# Patient Record
Sex: MALE | Race: WHITE | NOT HISPANIC OR LATINO | Employment: FULL TIME | URBAN - NONMETROPOLITAN AREA
[De-identification: names, ages, dates, MRNs, and addresses within clinical notes are randomized per-mention and may not be internally consistent; named-entity substitution may affect disease eponyms.]

---

## 2017-09-05 ENCOUNTER — OFFICE VISIT (OUTPATIENT)
Dept: PODIATRY | Facility: CLINIC | Age: 47
End: 2017-09-05

## 2017-09-05 VITALS
DIASTOLIC BLOOD PRESSURE: 90 MMHG | OXYGEN SATURATION: 96 % | BODY MASS INDEX: 39.36 KG/M2 | SYSTOLIC BLOOD PRESSURE: 164 MMHG | HEIGHT: 73 IN | HEART RATE: 93 BPM | WEIGHT: 297 LBS

## 2017-09-05 DIAGNOSIS — M72.2 PLANTAR FASCIITIS: Primary | ICD-10-CM

## 2017-09-05 PROCEDURE — 99202 OFFICE O/P NEW SF 15 MIN: CPT | Performed by: PODIATRIST

## 2017-09-05 NOTE — PROGRESS NOTES
"Kemal Dunbar  1970  46 y.o. male     Patient presents today with left foot pain. The patient had x-rays done on 08/30/2017.    9/5/2017  Chief Complaint   Patient presents with   • Left Foot - Pain           History of Present Illness    46-year-old male presents to clinic today with chief complaint of left foot pain.  Pain is located to the bottom of his heels.  He has been present for several weeks.  He states the pain is getting much better.  He was recently prescribed diclofenac which is helping tremendously.  Currently rates pain as a 3 out of 10.  He denies any injuries to his foot.  He has no other pedal complaints.         No past medical history on file.      No past surgical history on file.      No family history on file.      Social History     Social History   • Marital status:      Spouse name: N/A   • Number of children: N/A   • Years of education: N/A     Occupational History   • Not on file.     Social History Main Topics   • Smoking status: Not on file   • Smokeless tobacco: Not on file   • Alcohol use Not on file   • Drug use: Not on file   • Sexual activity: Not on file     Other Topics Concern   • Not on file     Social History Narrative         Current Outpatient Prescriptions   Medication Sig Dispense Refill   • AMLODIPINE-VALSARTAN-HCTZ PO Take  by mouth.     • diclofenac (VOLTAREN) 50 MG EC tablet Take 1 tablet by mouth 3 (Three) Times a Day. 30 tablet 0   • Buchu-Cornsilk-Ch Grass-Hydran (HYDRO-TABS PO) Take  by mouth.       No current facility-administered medications for this visit.          OBJECTIVE    /90  Pulse 93  Ht 73\" (185.4 cm)  Wt 297 lb (135 kg)  SpO2 96%  BMI 39.18 kg/m2      Review of Systems   Constitutional: Negative for chills and fever.   Cardiovascular: Negative for chest pain.   Gastrointestinal: Negative for constipation, diarrhea, nausea and vomiting.   Skin: Negative for wound.   Musculoskeletal:Left foot pain      Constitutional: well " developed, well nourished    HEENT: Normocephalic and atraumatic, normal hearing    Respiratory: Non labored respirations noted    Left lower extremity exam    Cardiovascular:    DP/PT pulses palpable    CFT brisk  to all digits  No erythema or edema noted     Musculoskeletal:  Muscle strength is 5/5 for all muscle groups tested   ROM of the 1st MTP is full without pain or crepitus  ROM of the MTJ is full without pain or crepitus    ROM of the STJ is full without pain or crepitus    ROM of the ankle joint is full without pain or crepitus    Mild tenderness to palpation to the medial tubercle of the left calcaneus.  Negative lateral squeeze test.  Rectus foot type     Dermatological:   Skin is warm, dry and intact    No subcutaneous nodules or masses noted    No open wounds noted     Neurological:   Sensation intact to light touch    DTR intact    Psychiatric: A&O x 3 with normal mood and affect. NAD.         Procedures        ASSESSMENT AND PLAN    Kemal was seen today for pain.    Diagnoses and all orders for this visit:    Plantar fasciitis    - Comprehensive foot and ankle exam performed  - X-rays  reviewed  - Patient advised to perform stretching exercises, icing and to make appropriate shoe gear changes to include wearing athletic type shoes with supportive insoles.  No bare foot walking.  Patient also given written instructions on how to correctly perform the stretching of the Achilles tendon/calf stretches, and the heel spur/plantar fasciitis regimen.  - Recommended over-the-counter insole such as power steps to properly support the arch in order to alleviate the tension in stress on the plantar fascia associated with normal daily walking. Patient was educated on the break-in period for new orthotics.  - Continue diclofenac  - Patient is in agreement with the current treatment plan.  All  questions were answered to their satisfaction.  - RTC  if symptoms worsen or fail to improve           This document has  been electronically signed by Fabian Ramirez DPM on September 5, 2017 5:53 PM     9/5/2017  5:53 PM    EMR Dragon/Transcription disclaimer:   Much of this encounter note is an electronic transcription/translation of spoken language to printed text. The electronic translation of spoken language may permit erroneous, or at times, nonsensical words or phrases to be inadvertently transcribed; Although I have reviewed the note for such errors, some may still exist.

## 2018-01-26 ENCOUNTER — HOSPITAL ENCOUNTER (EMERGENCY)
Facility: HOSPITAL | Age: 48
Discharge: HOME | End: 2018-01-26
Attending: EMERGENCY MEDICINE | Admitting: EMERGENCY MEDICINE

## 2018-01-26 VITALS
DIASTOLIC BLOOD PRESSURE: 90 MMHG | SYSTOLIC BLOOD PRESSURE: 158 MMHG | HEIGHT: 73 IN | WEIGHT: 300 LBS | BODY MASS INDEX: 39.76 KG/M2 | OXYGEN SATURATION: 96 % | TEMPERATURE: 98 F | RESPIRATION RATE: 20 BRPM | HEART RATE: 96 BPM
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
## 2018-01-26 PROCEDURE — 25010000002 TDAP 5-2.5-18.5 LF-MCG/0.5 SUSPENSION: Performed by: EMERGENCY MEDICINE

## 2018-01-26 PROCEDURE — 90715 TDAP VACCINE 7 YRS/> IM: CPT | Performed by: EMERGENCY MEDICINE

## 2018-01-26 PROCEDURE — 90471 IMMUNIZATION ADMIN: CPT | Performed by: EMERGENCY MEDICINE

## 2018-01-26 PROCEDURE — 99283 EMERGENCY DEPT VISIT LOW MDM: CPT

## 2018-01-26 RX ORDER — AMOXICILLIN AND CLAVULANATE POTASSIUM 875; 125 MG/1; MG/1
1 TABLET, FILM COATED ORAL EVERY 12 HOURS
Qty: 14 TABLET | Refills: 0 | Status: SHIPPED | OUTPATIENT
Start: 2018-01-26 | End: 2018-02-02

## 2018-01-26 RX ORDER — IBUPROFEN 200 MG
1 TABLET ORAL ONCE
Status: COMPLETED | OUTPATIENT
Start: 2018-01-26 | End: 2018-01-26

## 2018-01-26 RX ORDER — HYDROCHLOROTHIAZIDE 25 MG/1
25 TABLET ORAL
COMMUNITY
Start: 2017-09-27 | End: 2018-03-27

## 2018-01-26 RX ADMIN — BACITRACIN ZINC, NEOMYCIN, POLYMYXIN B 1 APPLICATION: 400; 3.5; 5 OINTMENT TOPICAL at 07:52

## 2018-01-26 RX ADMIN — TETANUS TOXOID, REDUCED DIPHTHERIA TOXOID AND ACELLULAR PERTUSSIS VACCINE, ADSORBED 0.5 ML: 5; 2.5; 8; 8; 2.5 SUSPENSION INTRAMUSCULAR at 07:51

## 2018-01-26 NOTE — ED PROVIDER NOTES
Subjective   Patient is a 47 y.o. male presenting with animal bite.   History provided by:  Patient  Animal Bite   Contact animal:  Dog  Location:  Hand  Hand injury location:  R hand  Time since incident:  1 hour  Pain details:     Quality:  Sharp    Severity:  Mild    Timing:  Constant    Progression:  Unchanged  Notifications:  None  Animal's rabies vaccination status:  Up to date  Animal in possession: yes    Tetanus status:  Out of date  Relieved by:  Nothing  Worsened by:  Nothing      Review of Systems   Constitutional: Negative.    HENT: Negative.    Eyes: Negative.    Respiratory: Negative.    Endocrine: Negative.    Genitourinary: Negative.    Skin: Positive for wound.   Allergic/Immunologic: Negative.        Past Medical History:   Diagnosis Date   • Hypertension        No Known Allergies    Past Surgical History:   Procedure Laterality Date   • APPENDECTOMY         History reviewed. No pertinent family history.    Social History     Social History   • Marital status:      Spouse name: N/A   • Number of children: N/A   • Years of education: N/A     Social History Main Topics   • Smoking status: Never Smoker   • Smokeless tobacco: None   • Alcohol use Yes      Comment: occasionally   • Drug use: No   • Sexual activity: Not Asked     Other Topics Concern   • None     Social History Narrative   • None           Objective   Physical Exam   Constitutional: He is oriented to person, place, and time. He appears well-developed and well-nourished.   HENT:   Head: Normocephalic.   Cardiovascular: Normal rate and regular rhythm.    Musculoskeletal: Normal range of motion.   Neurological: He is alert and oriented to person, place, and time.   Skin:   Several small abrasions and laceration to the right middle finger.  There is no suturable wound.   Vitals reviewed.      Procedures         ED Course  ED Course                  MDM      Final diagnoses:   Dog bite, initial encounter            Jagdish Pedroza,  MD  01/26/18 0800       Jagdish Pedroza MD  01/26/18 0800

## 2018-01-26 NOTE — ED NOTES
Patient presented to ED after dog bite from patients dog. Right middle finger with puncture wounds and swelling.     Sary Juarez LPN  01/26/18 0736

## 2018-03-06 ENCOUNTER — CONSULT (OUTPATIENT)
Dept: SLEEP MEDICINE | Facility: HOSPITAL | Age: 48
End: 2018-03-06

## 2018-03-06 VITALS
WEIGHT: 315 LBS | HEART RATE: 90 BPM | HEIGHT: 73 IN | SYSTOLIC BLOOD PRESSURE: 150 MMHG | DIASTOLIC BLOOD PRESSURE: 90 MMHG | OXYGEN SATURATION: 98 % | BODY MASS INDEX: 41.75 KG/M2

## 2018-03-06 DIAGNOSIS — G47.33 OBSTRUCTIVE SLEEP APNEA, ADULT: Primary | ICD-10-CM

## 2018-03-06 DIAGNOSIS — F98.8 ATTENTION DEFICIT DISORDER, UNSPECIFIED HYPERACTIVITY PRESENCE: ICD-10-CM

## 2018-03-06 PROCEDURE — 99203 OFFICE O/P NEW LOW 30 MIN: CPT | Performed by: INTERNAL MEDICINE
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
## 2018-03-30 ENCOUNTER — APPOINTMENT (OUTPATIENT)
Dept: SLEEP MEDICINE | Facility: HOSPITAL | Age: 48
End: 2018-03-30
Attending: INTERNAL MEDICINE

## 2018-04-04 ENCOUNTER — HOSPITAL ENCOUNTER (OUTPATIENT)
Dept: SLEEP MEDICINE | Facility: HOSPITAL | Age: 48
Discharge: HOME | End: 2018-04-04
Attending: INTERNAL MEDICINE | Admitting: INTERNAL MEDICINE

## 2018-04-04 VITALS — HEIGHT: 73 IN | WEIGHT: 315 LBS | BODY MASS INDEX: 41.75 KG/M2

## 2018-04-04 DIAGNOSIS — G47.33 OBSTRUCTIVE SLEEP APNEA, ADULT: ICD-10-CM

## 2018-04-04 PROCEDURE — 95811 POLYSOM 6/>YRS CPAP 4/> PARM: CPT | Performed by: INTERNAL MEDICINE

## 2018-04-04 PROCEDURE — 95811 POLYSOM 6/>YRS CPAP 4/> PARM: CPT

## 2018-04-12 DIAGNOSIS — G47.33 OSA (OBSTRUCTIVE SLEEP APNEA): Primary | ICD-10-CM

## 2018-04-20 ENCOUNTER — TELEPHONE (OUTPATIENT)
Dept: SLEEP MEDICINE | Facility: HOSPITAL | Age: 48
End: 2018-04-20

## 2018-04-20 NOTE — TELEPHONE ENCOUNTER
Returned patient call requesting sleep study results.  Results of recent sleep testing given to patient.  Patient voiced understanding and eagerness to get started on CPAP treatment.  Follow up appt made in June and CPAP orders faxed to [facility] per patient choice.

## 2018-06-19 ENCOUNTER — OFFICE VISIT (OUTPATIENT)
Dept: SLEEP MEDICINE | Facility: HOSPITAL | Age: 48
End: 2018-06-19

## 2018-06-19 VITALS
BODY MASS INDEX: 41.75 KG/M2 | WEIGHT: 315 LBS | HEIGHT: 73 IN | DIASTOLIC BLOOD PRESSURE: 90 MMHG | SYSTOLIC BLOOD PRESSURE: 160 MMHG

## 2018-06-19 DIAGNOSIS — G47.33 OBSTRUCTIVE SLEEP APNEA, ADULT: Primary | ICD-10-CM

## 2018-06-19 PROCEDURE — 99213 OFFICE O/P EST LOW 20 MIN: CPT | Performed by: INTERNAL MEDICINE

## 2018-06-19 NOTE — PROGRESS NOTES
CHIEF COMPLAINT: follow up split night polysomnography    HPI:    The patient is a 47 y.o. male.  He returns to sleep clinic to discuss the results of the recent split night polysomnography performed on 04/04/2018.  The AHI/RONNY was 27.8, REM AHI 0. The patient had a periodic limb movement index of 3.58.  The patient did not have any significant cardiac arrhythmias.    INTERVAL MEDICAL HISTORY: started on autocpap 8-20 cm H2O. his sx of IONA are improved with less excessive daytime sleepiness, reduced naps, and better sleep at night.      PAP Data:    Time frame: 04/30/2018 - 06/18/2018   Compliance 98 %  Average use on days used: 6hrs 15 min  PAP range : 8-20 cm H2O  Average 90% pressure: 11.5 cmH2O  Leak: 2 minutes  Average AHI 1.4 events/hr  Mask type: pillows  DME: [facility]    Bed time: 2230  Sleep latency: 30 minutes  Number of times awakens during the night: 1-2  Wake time: 0500  Estimated total sleep time at night: 6 hours  Caffeine intake: 2 sodas  Alcohol intake: 2-3 per week  Nap time: none  Sleepiness with Driving: none      MEDICATIONS:   Current Outpatient Prescriptions:   •  AMLODIPINE-VALSARTAN-HCTZ PO, Take  by mouth., Disp: , Rfl:   •  diclofenac (VOLTAREN) 50 MG EC tablet, Take 1 tablet by mouth 3 (Three) Times a Day., Disp: 30 tablet, Rfl: 0    PHYSICAL EXAM  Vital Signs (last 24 hours)       06/18 0700  -  06/19 0659 06/19 0700  -  06/19 0846   Most Recent    BP     160/90     160/90          Gen:  No distress, appears stated age, alert, oriented to person, place, and time  Heent:   NC/AT, PERRLA, EOMI, anicteric sclera    External ears/nose normal, OP clear, Mallamati 4  LUNGS: Clear breath sounds bilaterally, nonlabored breathing  CV:  Normal S1/S2, without murmur, no peripheral edema  ABD:  Non tender, non distended, bowel sounds not appreciated  EXT:  No cyanosis or clubbing      IMPRESSION AND PLAN:    1. Obstructive sleep apnea  1.  split night polysomnography on 04/04/2018, AHI of  27.8  2. Currently on 8-20 cm H2O  3. Continue PAP as prescribed.     RTC in 1 year unless sx change in the interim period.      All of the patient's questions were answered. He states understanding and agreement with my assessment and plan as above.  Total time 15 min, more than half spent in face to face counseling and coordination of care.    He agrees to return sooner on a prn basis.             This document has been electronically signed by Chi Guido MD on June 19, 2018           CC: Kelechi Avila MD          No ref. provider found

## 2019-06-24 ENCOUNTER — OFFICE VISIT (OUTPATIENT)
Dept: SLEEP MEDICINE | Facility: HOSPITAL | Age: 49
End: 2019-06-24

## 2019-06-24 VITALS
HEIGHT: 73 IN | SYSTOLIC BLOOD PRESSURE: 142 MMHG | WEIGHT: 315 LBS | OXYGEN SATURATION: 96 % | DIASTOLIC BLOOD PRESSURE: 86 MMHG | BODY MASS INDEX: 41.75 KG/M2 | HEART RATE: 90 BPM

## 2019-06-24 DIAGNOSIS — G47.33 OBSTRUCTIVE SLEEP APNEA, ADULT: Primary | ICD-10-CM

## 2019-06-24 PROBLEM — F90.1 ATTENTION-DEFICIT HYPERACTIVITY DISORDER, PREDOMINANTLY HYPERACTIVE TYPE: Status: ACTIVE | Noted: 2017-02-02

## 2019-06-24 PROBLEM — F41.9 ANXIETY: Status: ACTIVE | Noted: 2019-01-23

## 2019-06-24 PROCEDURE — 99213 OFFICE O/P EST LOW 20 MIN: CPT | Performed by: NURSE PRACTITIONER

## 2019-06-24 RX ORDER — PROPRANOLOL HYDROCHLORIDE 10 MG/1
TABLET ORAL
Refills: 2 | COMMUNITY
Start: 2019-04-04 | End: 2019-10-17

## 2019-06-24 RX ORDER — MELOXICAM 15 MG/1
15 TABLET ORAL DAILY
Refills: 0 | COMMUNITY
Start: 2019-05-23 | End: 2020-06-24

## 2019-06-24 RX ORDER — LISINOPRIL 10 MG/1
10 TABLET ORAL DAILY
Refills: 5 | COMMUNITY
Start: 2019-04-04 | End: 2019-10-17

## 2019-06-24 RX ORDER — OMEPRAZOLE 40 MG/1
CAPSULE, DELAYED RELEASE ORAL
Refills: 0 | COMMUNITY
Start: 2019-04-12

## 2019-06-24 RX ORDER — HYDROCHLOROTHIAZIDE 25 MG/1
TABLET ORAL
Refills: 5 | COMMUNITY
Start: 2019-04-04 | End: 2019-10-17 | Stop reason: SDUPTHER

## 2019-06-24 NOTE — PROGRESS NOTES
Sleep Clinic Follow Up    Date: 2019  Primary Care Physician: Kelechi Avila MD    Last office visit: 2018 (I reviewed this note)    CC: Follow up: IONA on CPAP      Interim History:  Since the last visit:    1) moderate IONA -  Kemal Dunbar has remained compliant with CPAP. He denies mask and machine issues, dry mouth, headaches, or pressures intolerance. He denies abnormal dreams, sleep paralysis, nasal congestion, URI sx.    Sleep Testin. Split night PSG on 2018, AHI of 27.8   2. CPAP titration on same day, recommended 8-20 cm H2O   3. Currently on 8-20 cm H2O    PAP Data:    Time frame: 2018-2019   Compliance 100 %  Average use on days used: 7 hrs 29 min  Percent of days with usage greater than or equal to 4 hours: 98.6%  PAP range : 8-20 cm H2O  Average 90% pressure: 11.6 cmH2O  Leak: 1 minutes  Average AHI 1.9 events/hr  Mask type: Nasal pillows  DME: [facility]    Bed time: 2100  Sleep latency: 15 minutes  Number of times awakens during the night: 1  Wake time: 0230  Estimated total sleep time at night: 8 hours  Caffeine intake: 0 cups of coffee, 0 cups of tea, and 1 sodas per day  Alcohol intake: 4-5 drinks per week  Nap time: 2-3 times per week in afternoon   Sleepiness with Driving: none     [place] - 3    2) Patient denies RLS symptoms.     PMHx, FH, SH reviewed and pertinent changes are:  unchanged from last office visit on 2019      REVIEW OF SYSTEMS:   Negative for chest pain, fever, cough, SOA, abdominal pain. Smoking:none        Exam:  Vitals:    19 1106   BP: 142/86   Pulse: 90   SpO2: 96%           19  1106   Weight: (!) 146 kg (321 lb)     Body mass index is 42.36 kg/m². Patient's Body mass index is 42.36 kg/m². BMI is above normal parameters. Recommendations include: referral to primary care.      Gen:                No distress, conversant, pleasant, appears stated age, alert, oriented  Eyes:               Anicteric sclera,  moist conjunctiva, no lid lag                           PERRL, EOMI   Heent:             NC/AT                          Oropharynx clear                          Normal hearing  Lungs:             Normal effort, non-labored breathing                          Clear to auscultation bilaterally          CV:                  Normal S1/S2, no murmur                          No lower extremity edema  ABD:               Soft, rounded, non-distended                          Normal bowel sounds                    Psych:             Appropriate affect  Neuro:             CN 2-12 appear intact    Past Medical History:   Diagnosis Date   • Hypertension        Current Outpatient Medications:   •  hydrochlorothiazide (HYDRODIURIL) 25 MG tablet, TK 1 T PO QAM, Disp: , Rfl: 5  •  lisinopril (PRINIVIL,ZESTRIL) 10 MG tablet, Take  by mouth Daily., Disp: , Rfl: 5  •  meloxicam (MOBIC) 15 MG tablet, Take 15 mg by mouth Daily., Disp: , Rfl: 0  •  omeprazole (priLOSEC) 40 MG capsule, TK ONE C PO  QD PRN, Disp: , Rfl: 0  •  propranolol (INDERAL) 10 MG tablet, TK 1 T PO TID PRN, Disp: , Rfl: 2      Assessment and Plan:    1. Obstructive sleep apnea Established, stable (1)  1. Compliant with PAP therapy  2. Continue PAP as prescribed  3. Script for PAP supplies  4. Return to clinic in 1 year with compliance report unless change in symptoms in interim period  2. HTN        Total time 15 min, more than half spent in face to face counseling and coordination of care.    RTC in 12 months. Patient agrees to return sooner if changes in symptoms.        This document has been electronically signed by ASHLEE Montero on June 24, 2019 11:15 AM          CC: Kelechi Avila MD          No ref. provider found

## 2019-09-09 ENCOUNTER — OFFICE VISIT (OUTPATIENT)
Dept: CARDIOLOGY | Facility: CLINIC | Age: 49
End: 2019-09-09

## 2019-09-09 ENCOUNTER — APPOINTMENT (OUTPATIENT)
Dept: LAB | Facility: HOSPITAL | Age: 49
End: 2019-09-09

## 2019-09-09 ENCOUNTER — HOSPITAL ENCOUNTER (OUTPATIENT)
Dept: GENERAL RADIOLOGY | Facility: HOSPITAL | Age: 49
Discharge: HOME | End: 2019-09-09
Admitting: INTERNAL MEDICINE

## 2019-09-09 VITALS
HEIGHT: 73 IN | BODY MASS INDEX: 41.75 KG/M2 | DIASTOLIC BLOOD PRESSURE: 84 MMHG | OXYGEN SATURATION: 98 % | SYSTOLIC BLOOD PRESSURE: 154 MMHG | WEIGHT: 315 LBS | HEART RATE: 85 BPM

## 2019-09-09 DIAGNOSIS — R06.09 DYSPNEA ON EXERTION: Primary | ICD-10-CM

## 2019-09-09 DIAGNOSIS — E66.01 CLASS 3 SEVERE OBESITY DUE TO EXCESS CALORIES WITH SERIOUS COMORBIDITY AND BODY MASS INDEX (BMI) OF 40.0 TO 44.9 IN ADULT (HCC): ICD-10-CM

## 2019-09-09 DIAGNOSIS — R06.09 DOE (DYSPNEA ON EXERTION): Primary | ICD-10-CM

## 2019-09-09 LAB — NT-PROBNP SERPL-MCNC: 114.5 PG/ML (ref 5–450)

## 2019-09-09 PROCEDURE — 93005 ELECTROCARDIOGRAM TRACING: CPT | Performed by: INTERNAL MEDICINE

## 2019-09-09 PROCEDURE — 36415 COLL VENOUS BLD VENIPUNCTURE: CPT | Performed by: INTERNAL MEDICINE

## 2019-09-09 PROCEDURE — 99204 OFFICE O/P NEW MOD 45 MIN: CPT | Performed by: INTERNAL MEDICINE

## 2019-09-09 PROCEDURE — 83880 ASSAY OF NATRIURETIC PEPTIDE: CPT | Performed by: INTERNAL MEDICINE

## 2019-09-09 PROCEDURE — 71046 X-RAY EXAM CHEST 2 VIEWS: CPT

## 2019-09-09 RX ORDER — ESCITALOPRAM OXALATE 10 MG/1
10 TABLET ORAL DAILY
COMMUNITY
Start: 2019-07-17 | End: 2019-10-17 | Stop reason: SDUPTHER

## 2019-09-09 NOTE — PATIENT INSTRUCTIONS
Dr. Armstrong has recommended a pharmacologic (dobutamine) stress test with echocardiography.    What is a Dobutamine Stress Echo Test?     Why do I need a stress test?     This test helps your physician determine how your heart functions when it is made to work harder by injecting dobutamine. Dobutamine is a drug that has an effect on the heart similar to exercise. This test is done on patients that are unable to exercise adequately or have severe lung disease. An echocardiogram, the ultrasound study of the heart, evaluates the heart’s size, how strongly it pumps blood, and how well the valves are working.     The dobutamine stress echo test is useful to determine:   • If there is a decreased supply of blood and oxygen to the heart at rest as well as with exercise   • If there is reduced movement of the heart muscle.   • Overall level of cardiovascular conditioning   • How quickly the heart recovers after exercise   • How hard the heart can work before symptoms develop   • Estimate the risk of surgery that can cause cardiac complications     What happens during the test?   • An echo technician will do a resting scan of your heart while you are lying down on an exam table. You will lie on your back and on your left side  . • A stress  will prep ten small areas on your chest and place electrodes (small, flat, sticky patches) on these areas. The electrodes are attached to an EKG monitor that charts your heart’s electrical activity during the test.   • You will be lying down on an exam table while the technician performs a resting EKG and blood pressure.   • A nurse will place an IV into a vein in your arm or hand. Next, your heart will be “stressed” by injecting a medication called dobutamine into the IV. This medication will increase your heart rate.   • Please tell the technician immediately if you have chest pain, shortness of breath, or any other unusual symptoms at any time.   • The stress lab staff  will watch for any changes on the EKG monitor that suggest the test should be stopped. The testing area is supervised by a physician.   • The echo technician will take images of your heart every three minutes during the test. • At the peak effects of dobutamine, a second echocardiogram will be taken to visualize the heart’s motion with exercise.   • Your heart rate, blood pressure, and EKG will continue to be monitored until the levels are returning to normal. One more echo scan will be done as your heart rate returns to normal    The risks     This test is very safe and there are usually no problems. There is a small risk of an abnormal heartbeat, chest pain or nausea and, if this happens, the appropriate action will be taken. On very rare occasions (approximately 1 in 2000) the test is associated with life-threatening events. If you have any questions about the risks associated with the procedure, your medical team will be able to discuss them with you at the appointment.      Instruction checklist for the dobutamine stress echo test:     . • Bring a list of medications you take with you to the test. Include the name and dosage amounts of each medicine. This information can be found on the prescription or bottle label.     You may take any of your regular morning medications unless otherwise instructed.       § If you have asthma, please bring your inhaler medication with you.     § If you have diabetes, ask your physician how to adjust your medications the day of your test.     • Please refrain from any strenuous exercise or activities the day before your test, or the day of the test     • Do Not eat or drink anything except for small amounts of water for 4 hours prior to your testing time.      • Do Not use lotion or powders on your chest the day of the test.     • Wear loose fitting, comfortable clothing. Pants or shorts and short sleeve shirts are preferred. (No long sleeves.) Do not wear one-piece  undergarments or body suits.      • The Dobutamine Stress Echo takes about one hour to complete including check-in time and actual test time.      • If you need to CANCEL OR CHANGE your appointment, please call [phone].     • If you have any QUESTIONS about the test, Please call [phone] and ask to speak to Dr. Patti Davis's Medical Assistant. Please leave a message if prompted to do so. We will return your call as soon as possible.     • We request a 24 hour notice for changes or cancellations.    You MUST complete the DSE within 30 calendar days of being ordered by Dr. Armstrong.     You MUST arrive for your DSE appointment 10 minutes BEFORE the scheduled time, or your test will be rescheduled.    Results:    Dr. Armstrong will be physically present during your dobutamine stress echo.  You will be given the results of your test in real time.    Dr. Armstrong has recommended a Six-Minute Walk Test    What Is the Six-Minute Walk Test?    The American [employer] Society describes the six-minute walk test as a measure of functional status or fitness. It is used as a simple measure of aerobic exercise capacity. The results of this test may or may not lead your doctor to do more sophisticated measures of your heart and lung function. During this test, you walk at your normal pace for six minutes. This test can be used to monitor your response to treatments for heart, lung and other health problems. This test is commonly used for people with pulmonary hypertension, interstitial lung disease, pre-lung transplant evaluation or COPD.      What to Expect When Doing the Test      Preparing for your test:  · Wear clothes and shoes that are comfortable.  · You may use your usual walking aids such as a cane or walker, if needed.  · It is okay to eat a light meal prior to your test.  · Take your usual medications.  · Do not exercise within two hours of testing.      During the test:  · The lindsay will measure your  blood pressure, pulse and oxygen level usually with a pulse oximeter before you start to walk.  · You should be given the following instructions: The object of the test is to walk as far as possible for six minutes. You will walk at your normal pace to a chair or cone, and turn around. And you continue to walk back and forth for six minutes.  · Let the staff know if you are having chest pain or breathing difficulty.  · It is acceptable to slow down, rest or stop. After every minute interval, you will be given an update.      Safety:  · The lindsay will watch to see if you have breathing difficulty or chest pain.  · Oxygen and other supplies will be nearby if you need them.      Understanding the Results  The results of your test are then compared to what is known to be normal for people in your weight, height, gender and age categories. They can be used to estimate response to treatment, especially if repeated after a time interval, for instance six months or a year later. After your test, your provider may change your medication or exercise program based on your results.      What Are the Risks?  This is a low-risk medical evaluation. Medical help is easily available while the test is being done.          This content was developed in partnership with the [employer], the philanthropic arm of the [employer].  Approved by Scientific and Medical Editorial Review Panel. Last reviewed May 31, 2017.      Obesity, Adult  Obesity is having too much body fat. If you have a BMI of 30 or more, you are obese. BMI is a number that explains how much body fat you have. Obesity is often caused by taking in (consuming) more calories than your body uses.  Obesity can cause serious health problems. Changing your lifestyle can help to treat obesity.  Follow these instructions at home:  Eating and drinking    · Follow advice from your doctor about what to eat and drink. Your doctor may tell you to:  ? Cut  down on (limit) fast foods, sweets, and processed snack foods.  ? Choose low-fat options. For example, choose low-fat milk instead of whole milk.  ? Eat 5 or more servings of fruits or vegetables every day.  ? Eat at home more often. This gives you more control over what you eat.  ? Choose healthy foods when you eat out.  ? Learn what a healthy portion size is. A portion size is the amount of a certain food that is healthy for you to eat at one time. This is different for each person.  ? Keep low-fat snacks available.  ? Avoid sugary drinks. These include soda, fruit juice, iced tea that is sweetened with sugar, and flavored milk.  ? Eat a healthy breakfast.  · Drink enough water to keep your pee (urine) clear or pale yellow.  · Do not go without eating for long periods of time (do not fast).  · Do not go on popular or trendy diets (fad diets).  Physical Activity  · Exercise often, as told by your doctor. Ask your doctor:  ? What types of exercise are safe for you.  ? How often you should exercise.  · Warm up and stretch before being active.  · Do slow stretching after being active (cool down).  · Rest between times of being active.  Lifestyle  · Limit how much time you spend in front of your TV, computer, or video game system (be less sedentary).  · Find ways to reward yourself that do not involve food.  · Limit alcohol intake to no more than 1 drink a day for nonpregnant women and 2 drinks a day for men. One drink equals 12 oz of beer, 5 oz of wine, or 1½ oz of hard liquor.  General instructions  · Keep a weight loss journal. This can help you keep track of:  ? The food that you eat.  ? The exercise that you do.  · Take over-the-counter and prescription medicines only as told by your doctor.  · Take vitamins and supplements only as told by your doctor.  · Think about joining a support group. Your doctor may be able to help with this.  · Keep all follow-up visits as told by your doctor. This is important.  Contact  a doctor if:  · You cannot meet your weight loss goal after you have changed your diet and lifestyle for 6 weeks.  This information is not intended to replace advice given to you by your health care provider. Make sure you discuss any questions you have with your health care provider.  Document Released: 03/11/2013 Document Revised: 05/25/2017 Document Reviewed: 10/05/2016  [employer] Interactive Patient Education © 2019 [employer]

## 2019-09-09 NOTE — PROGRESS NOTES
Cardiovascular Medicine      Farzad Armstrong M.D., Ph.D., [facility], Kelechi Arguello MD  [address] [place] [address]    Thank you for asking me to see Kemal Dunbar for shortness of breath.  Dyspnea has been moderate. . Episodes usually occur all day and have been stable. Associated symptoms include: cramps. The symptoms are aggravated by exercise and exertion, and relieved by rest.  The patient denies  history of VTE. The patient denies  smoking. The patient denies  history of CHF, CAD or MI. The patient denies  pulmonary conditions or exposures. The patient denies  weight gain. The patient denies  LE edema. The patient denies  PND, orthopnea. The patient reports snoring. The patient reports prior sleep evaluation. He has been diagnosed with IONA. He sees Dr. Guido. He has been on CPAP for about one year.  The patient denies  prior PFTs or pulmonary evaluation. He has not had PFTs or a CXR. He has noticed this primarily with stairs. He started riding his bike about 6 months ago. He has noticed his dyspnea with bike riding.     VTE Risk Factors: obesity  CAD/CHF Risk Factors: dyslipidemia, hypertension, male gender, obesity (BMI >= 30 kg/m2) and sedentary lifestyle    The following portions of the patient's history were reviewed and updated as appropriate: allergies, current medications, past family history, past medical history, past social history, past surgical history and problem list.      History of Present Illness  This is a 48 y.o. male with:    1. Dyspnea  2.  Risk factors: Hypertension, obesity, IONA    Review of Systems - Review of Systems   Cardiovascular: Positive for dyspnea on exertion. Negative for chest pain, claudication, cyanosis, irregular heartbeat, leg swelling, near-syncope, orthopnea, palpitations, paroxysmal nocturnal dyspnea and syncope.   Respiratory: Positive for cough, shortness of breath, sleep disturbances due to breathing and snoring. Negative  "for hemoptysis, sputum production and wheezing.         All other systems were reviewed and were negative.    family history is not on file.     reports that he has never smoked. He does not have any smokeless tobacco history on file. He reports that he drinks alcohol. He reports that he does not use drugs.    No Known Allergies      Current Outpatient Medications:   •  hydrochlorothiazide (HYDRODIURIL) 25 MG tablet, TK 1 T PO QAM, Disp: , Rfl: 5  •  lisinopril (PRINIVIL,ZESTRIL) 10 MG tablet, Take  by mouth Daily., Disp: , Rfl: 5  •  meloxicam (MOBIC) 15 MG tablet, Take 15 mg by mouth Daily., Disp: , Rfl: 0  •  omeprazole (priLOSEC) 40 MG capsule, TK ONE C PO  QD PRN, Disp: , Rfl: 0  •  propranolol (INDERAL) 10 MG tablet, TK 1 T PO TID PRN, Disp: , Rfl: 2      Physical Exam:  Vitals:    09/09/19 0937   BP: 154/80   BP Location: Left arm   Patient Position: Sitting   Cuff Size: Adult   Pulse: 85   SpO2: 98%   Weight: (!) 146 kg (321 lb 1.6 oz)   Height: 185.4 cm (73\")   PainSc: 0-No pain     Pulse Ox: Normal  on room air  General: alert, appears stated age and cooperative     Body Habitus: morbidly obese    HEENT: Head: Normocephalic, no lesions, without obvious abnormality. No arcus senilis, xanthelasma or xanthomas.    Neuro: alert, oriented x3  speech normal in context and clarity  memory intact grossly  Pulses: 2+ and symmetric  JVP: Volume/Pulsation: Normal.  Normal waveforms.   Appropriate inspiratory decrease.  No Kussmaul's. No Charli's.   Carotid Exam: no bruit normal pulsation bilaterally   Carotid Volume: normal.     Subclavian Bruit: absent  Vertebral Bruit: absent  Respirations: no increased work of breathing   Chest:  Normal    Pulmonary:Normal     Precordium: Normal impulses. P2 is not palpable.  RV Heave: absent  LV Heave: absent  [place]:  normal size and placement  Palpable S4: absent.  Heart rate: normal    Heart Rhythm: regular     Heart Sounds: S1: normal intensity  S2: normal intensity  S3: " absent   S4: absent  Opening Snap: absent    A2-OS:  no  Pericardial Rub:  Absent  Ejection click: None      Murmurs:  absent   Abdomen:   Abdominal Aorta: no bruits  Renal Arteries: no bruits  Extremity: moves all extremities equally.     DATA REVIEWED:     EKG. I personally reviewed and interpreted the EKG.        --------------------------------------------------------------------------------------------------  LABS:     The 10-year CVD risk score (Marcin et al., 2008) is: 17.4%    Values used to calculate the score:      Age: 48 years      Sex: Male      Diabetic: No      Tobacco smoker: No      Systolic Blood Pressure: 142 mmHg      Is BP treated: Yes      HDL Cholesterol: 56 mg/dL      Total Cholesterol: 299 mg/dL         No results found for: GLUCOSE, BUN, CREATININE, EGFRIFNONA, EGFRIFAFRI, BCR, K, CO2, CALCIUM, PROTENTOTREF, ALBUMIN, LABIL2, AST, ALT  No results found for: WBC, HGB, HCT, MCV, PLT  Lab Results   Component Value Date    CHOL 299 (H) 07/19/2018    TRIG 239 (H) 07/19/2018    HDL 56 07/19/2018     (H) 07/19/2018     No results found for: TSH, L1JYCGP, U6MRVWK, THYROIDAB  No results found for: CKTOTAL, CKMB, CKMBINDEX, TROPONINI, TROPONINT  No results found for: HGBA1C  No results found for: DDIMER  No results found for: ALT  No results found for: HGBA1C  No results found for: GLUF, MICROALBUR, CREATININE  No results found for: IRON, TIBC, FERRITIN  No results found for: INR, PROTIME    Assessment/Plan     1. Dyspnea on exertion. WHO-FC  is currently: II.  I suspect the etiology multifactorial.  There are no signs of HF on examination. The patient does have risk factors for CHF.The patient has not had LVEF and RVEF studied. Additionally, the patient's BMI is elevated and likely contributing to dyspnea.  I did have an extended discussion with the patient about my thoughts regarding their possible etiology. There is clinical concern for sleep disorder. Further, dyspnea  could be an  "anginal equivalent in this patient.  We should evaluate for obstructive CAD.  Unfortunately, because the patient's dyspnea they are unable to exercise on a treadmill.  -TTE 2D and 6MWT    2. Cardiac Risk Assessments:  -Continue follow-up visits with PCP for monitoring of labs; Dietary changes: Increase soluble fiber: A printed copy of a low fat, low cholesterol diet was given; Reduce saturated fat, \"trans\" monounsaturated fatty acids, and cholesterol; Exercise changes:  Encouraged daily aerobic activity.    -Essential HTN is a significant risk factor for stroke, heart disease and vascular disease. I've recommended the patient continue current medications, if any, as prescribed by the primary care provider. I recommended they have close follow-up for ongoing mgmt of this and the medical comorbidities associated with HTN with their PCP.    The patient's BMI is Body mass index is 42.36 kg/m²..  This places the patient in weight class:  Obese Class III extreme obesity: > or equal to 40kg/m2.   -Weight loss hand-out  -Exercise intervention:   Hand out, increase aerobic activity  cut out extra servings, decrease soda or juice intake, eat breakfast, eat more fruits and vegetables, family to eat at dinner table more often, have 3 meals a day, increase physical activity, increase water intake, keep TV off during meals, plan meals, reduce fast food intake, reduce portion size and reduce screen time  -Close PCP follow-up for Body mass index is 42.36 kg/m²..     Nicotine status: has never smoked      Return for APRN for echo results in 3 to 4 weeks.      "

## 2019-09-10 ENCOUNTER — TELEPHONE (OUTPATIENT)
Dept: CARDIOLOGY | Facility: CLINIC | Age: 49
End: 2019-09-10

## 2019-09-17 ENCOUNTER — PROCEDURE VISIT (OUTPATIENT)
Dept: CARDIOLOGY | Facility: CLINIC | Age: 49
End: 2019-09-17

## 2019-09-17 ENCOUNTER — OFFICE VISIT (OUTPATIENT)
Dept: PULMONOLOGY | Facility: CLINIC | Age: 49
End: 2019-09-17

## 2019-09-17 DIAGNOSIS — R06.09 DYSPNEA ON EXERTION: ICD-10-CM

## 2019-09-17 LAB
BH CV ECHO MEAS - ACS: 2.5 CM
BH CV ECHO MEAS - AO ISTHMUS: 2 CM
BH CV ECHO MEAS - AO MAX PG (FULL): 2.5 MMHG
BH CV ECHO MEAS - AO MAX PG: 9.1 MMHG
BH CV ECHO MEAS - AO MEAN PG (FULL): 2 MMHG
BH CV ECHO MEAS - AO MEAN PG: 5 MMHG
BH CV ECHO MEAS - AO ROOT AREA (BSA CORRECTED): 1.1
BH CV ECHO MEAS - AO ROOT AREA: 6.6 CM^2
BH CV ECHO MEAS - AO ROOT DIAM: 2.9 CM
BH CV ECHO MEAS - AO V2 MAX: 151 CM/SEC
BH CV ECHO MEAS - AO V2 MEAN: 105 CM/SEC
BH CV ECHO MEAS - AO V2 VTI: 24.8 CM
BH CV ECHO MEAS - ASC AORTA: 3.4 CM
BH CV ECHO MEAS - AVA(I,A): 2.6 CM^2
BH CV ECHO MEAS - AVA(I,D): 2.6 CM^2
BH CV ECHO MEAS - AVA(V,A): 2.7 CM^2
BH CV ECHO MEAS - AVA(V,D): 2.7 CM^2
BH CV ECHO MEAS - BSA(HAYCOCK): 2.8 M^2
BH CV ECHO MEAS - BSA: 2.6 M^2
BH CV ECHO MEAS - BZI_BMI: 42.4 KILOGRAMS/M^2
BH CV ECHO MEAS - BZI_METRIC_HEIGHT: 185.4 CM
BH CV ECHO MEAS - BZI_METRIC_WEIGHT: 145.6 KG
BH CV ECHO MEAS - EDV(CUBED): 79.5 ML
BH CV ECHO MEAS - EDV(MOD-SP2): 62 ML
BH CV ECHO MEAS - EDV(MOD-SP4): 77 ML
BH CV ECHO MEAS - EDV(TEICH): 83.1 ML
BH CV ECHO MEAS - EF(CUBED): 66 %
BH CV ECHO MEAS - EF(MOD-BP): 67 %
BH CV ECHO MEAS - EF(MOD-SP2): 69.4 %
BH CV ECHO MEAS - EF(MOD-SP4): 66.2 %
BH CV ECHO MEAS - EF(TEICH): 57.9 %
BH CV ECHO MEAS - ESV(CUBED): 27 ML
BH CV ECHO MEAS - ESV(MOD-SP2): 19 ML
BH CV ECHO MEAS - ESV(MOD-SP4): 26 ML
BH CV ECHO MEAS - ESV(TEICH): 35 ML
BH CV ECHO MEAS - FS: 30.2 %
BH CV ECHO MEAS - IVS/LVPW: 0.81
BH CV ECHO MEAS - IVSD: 1.3 CM
BH CV ECHO MEAS - LA DIMENSION: 3.8 CM
BH CV ECHO MEAS - LA/AO: 1.3
BH CV ECHO MEAS - LV DIASTOLIC VOL/BSA (35-75): 29.3 ML/M^2
BH CV ECHO MEAS - LV MASS(C)D: 245 GRAMS
BH CV ECHO MEAS - LV MASS(C)DI: 93.1 GRAMS/M^2
BH CV ECHO MEAS - LV MAX PG: 6.7 MMHG
BH CV ECHO MEAS - LV MEAN PG: 3 MMHG
BH CV ECHO MEAS - LV SYSTOLIC VOL/BSA (12-30): 9.9 ML/M^2
BH CV ECHO MEAS - LV V1 MAX: 129 CM/SEC
BH CV ECHO MEAS - LV V1 MEAN: 78.4 CM/SEC
BH CV ECHO MEAS - LV V1 VTI: 20.9 CM
BH CV ECHO MEAS - LVIDD: 4.3 CM
BH CV ECHO MEAS - LVIDS: 3 CM
BH CV ECHO MEAS - LVLD AP2: 7.4 CM
BH CV ECHO MEAS - LVLD AP4: 7.8 CM
BH CV ECHO MEAS - LVLS AP2: 5.7 CM
BH CV ECHO MEAS - LVLS AP4: 5.4 CM
BH CV ECHO MEAS - LVOT AREA (M): 3.1 CM^2
BH CV ECHO MEAS - LVOT AREA: 3.1 CM^2
BH CV ECHO MEAS - LVOT DIAM: 2 CM
BH CV ECHO MEAS - LVPWD: 1.6 CM
BH CV ECHO MEAS - MV A MAX VEL: 77.5 CM/SEC
BH CV ECHO MEAS - MV DEC SLOPE: 969 CM/SEC^2
BH CV ECHO MEAS - MV E MAX VEL: 88.8 CM/SEC
BH CV ECHO MEAS - MV E/A: 1.1
BH CV ECHO MEAS - MV MAX PG: 3.9 MMHG
BH CV ECHO MEAS - MV MEAN PG: 2 MMHG
BH CV ECHO MEAS - MV P1/2T MAX VEL: 101 CM/SEC
BH CV ECHO MEAS - MV P1/2T: 30.5 MSEC
BH CV ECHO MEAS - MV V2 MAX: 98.6 CM/SEC
BH CV ECHO MEAS - MV V2 MEAN: 60.4 CM/SEC
BH CV ECHO MEAS - MV V2 VTI: 30.4 CM
BH CV ECHO MEAS - MVA P1/2T LCG: 2.2 CM^2
BH CV ECHO MEAS - MVA(P1/2T): 7.2 CM^2
BH CV ECHO MEAS - MVA(VTI): 2.2 CM^2
BH CV ECHO MEAS - RVDD: 3.2 CM
BH CV ECHO MEAS - SI(AO): 62.3 ML/M^2
BH CV ECHO MEAS - SI(CUBED): 20 ML/M^2
BH CV ECHO MEAS - SI(LVOT): 25 ML/M^2
BH CV ECHO MEAS - SI(MOD-SP2): 16.3 ML/M^2
BH CV ECHO MEAS - SI(MOD-SP4): 19.4 ML/M^2
BH CV ECHO MEAS - SI(TEICH): 18.3 ML/M^2
BH CV ECHO MEAS - SV(AO): 163.8 ML
BH CV ECHO MEAS - SV(CUBED): 52.5 ML
BH CV ECHO MEAS - SV(LVOT): 65.7 ML
BH CV ECHO MEAS - SV(MOD-SP2): 43 ML
BH CV ECHO MEAS - SV(MOD-SP4): 51 ML
BH CV ECHO MEAS - SV(TEICH): 48.1 ML

## 2019-09-17 PROCEDURE — 94060 EVALUATION OF WHEEZING: CPT | Performed by: INTERNAL MEDICINE

## 2019-09-17 PROCEDURE — 94729 DIFFUSING CAPACITY: CPT | Performed by: INTERNAL MEDICINE

## 2019-09-17 PROCEDURE — 94727 GAS DIL/WSHOT DETER LNG VOL: CPT | Performed by: INTERNAL MEDICINE

## 2019-09-17 PROCEDURE — 94618 PULMONARY STRESS TESTING: CPT | Performed by: INTERNAL MEDICINE

## 2019-09-17 NOTE — PROGRESS NOTES
Kemal Efra Dunbar  Procedure: 6 Minute Walk Test   Indication:marshall    Pretest: BP:140/80               HR:71               Sa02:98               Dyspnea:0               Fatigue:0    Post Test: BP:144/76               HR:107               Sa02:98               Dyspnea:0.5               Fatigue:0    First 6MWT:yes    Supplemental oxygen during test:no    Stopped before 6 minutes:no    Pauses:none    Results in distance walked:448.46 m    Did individual experience any pain or discomfort:no    Attestation:  I was immediately available for the above test and agree with the data.     NYHA Functional Class I.    Farzad Armstrong MD PhD    -----------------------------------------------------------------------------------------------------------------  [facility] 6MWT to the ATS guidelines for 6MWT (2002). Predicted distance is from:      -------------------------------------------------------------------------------------------------------------

## 2019-10-01 ENCOUNTER — HOSPITAL ENCOUNTER (OUTPATIENT)
Dept: CARDIOLOGY | Facility: HOSPITAL | Age: 49
Discharge: HOME | End: 2019-10-01
Admitting: INTERNAL MEDICINE

## 2019-10-01 VITALS — BODY MASS INDEX: 39.58 KG/M2 | WEIGHT: 300 LBS

## 2019-10-01 LAB
BH CV ECHO MEAS - BSA(HAYCOCK): 2.8 M^2
BH CV ECHO MEAS - BSA: 2.6 M^2
BH CV ECHO MEAS - BZI_BMI: 42.4 KILOGRAMS/M^2
BH CV ECHO MEAS - BZI_METRIC_HEIGHT: 185.4 CM
BH CV ECHO MEAS - BZI_METRIC_WEIGHT: 145.6 KG
BH CV STRESS BP STAGE 1: NORMAL
BH CV STRESS BP STAGE 2: NORMAL
BH CV STRESS BP STAGE 3: NORMAL
BH CV STRESS DOSE DOBUTAMINE STAGE 1: 10
BH CV STRESS DOSE DOBUTAMINE STAGE 2: 20
BH CV STRESS DOSE DOBUTAMINE STAGE 3: 30
BH CV STRESS DURATION MIN STAGE 1: 3
BH CV STRESS DURATION MIN STAGE 2: 3
BH CV STRESS DURATION MIN STAGE 3: 2
BH CV STRESS DURATION SEC STAGE 1: 0
BH CV STRESS DURATION SEC STAGE 2: 0
BH CV STRESS DURATION SEC STAGE 3: 0
BH CV STRESS ECHO POST STRESS EJECTION FRACTION EF: 70 %
BH CV STRESS HR STAGE 1: 91
BH CV STRESS HR STAGE 2: 115
BH CV STRESS HR STAGE 3: 153
BH CV STRESS PROTOCOL 1: NORMAL
BH CV STRESS RECOVERY BP: NORMAL MMHG
BH CV STRESS RECOVERY HR: 100 BPM
BH CV STRESS STAGE 1: 1
BH CV STRESS STAGE 2: 2
BH CV STRESS STAGE 3: 3
MAXIMAL PREDICTED HEART RATE: 171 BPM
PERCENT MAX PREDICTED HR: 90.64 %
STRESS BASELINE BP: NORMAL MMHG
STRESS BASELINE HR: 75 BPM
STRESS PERCENT HR: 107 %
STRESS POST ESTIMATED WORKLOAD: 1 METS
STRESS POST EXERCISE DUR MIN: 8 MIN
STRESS POST EXERCISE DUR SEC: 19 SEC
STRESS POST PEAK BP: NORMAL MMHG
STRESS POST PEAK HR: 155 BPM
STRESS TARGET HR: 145 BPM

## 2019-10-01 PROCEDURE — 93351 STRESS TTE COMPLETE: CPT | Performed by: INTERNAL MEDICINE

## 2019-10-01 PROCEDURE — 93325 DOPPLER ECHO COLOR FLOW MAPG: CPT

## 2019-10-01 PROCEDURE — 93017 CV STRESS TEST TRACING ONLY: CPT

## 2019-10-01 PROCEDURE — 93320 DOPPLER ECHO COMPLETE: CPT

## 2019-10-01 PROCEDURE — 25010000002 ATROPINE PER 0.01 MG: Performed by: INTERNAL MEDICINE

## 2019-10-01 PROCEDURE — 93350 STRESS TTE ONLY: CPT

## 2019-10-01 PROCEDURE — 25010000002 DOBUTAMINE: Performed by: INTERNAL MEDICINE

## 2019-10-01 PROCEDURE — 93352 ADMIN ECG CONTRAST AGENT: CPT | Performed by: INTERNAL MEDICINE

## 2019-10-01 RX ORDER — ATROPINE SULFATE 1 MG/ML
0.6 INJECTION, SOLUTION INTRAMUSCULAR; INTRAVENOUS; SUBCUTANEOUS ONCE
Status: COMPLETED | OUTPATIENT
Start: 2019-10-01 | End: 2019-10-01

## 2019-10-01 RX ADMIN — ATROPINE SULFATE 0.6 MG: 1 INJECTION, SOLUTION INTRAMUSCULAR; INTRAVENOUS; SUBCUTANEOUS at 13:22

## 2019-10-01 RX ADMIN — DOBUTAMINE 10 MCG/KG/MIN: 12.5 INJECTION, SOLUTION, CONCENTRATE INTRAVENOUS at 13:22

## 2019-10-17 ENCOUNTER — OFFICE VISIT (OUTPATIENT)
Dept: CARDIOLOGY | Facility: CLINIC | Age: 49
End: 2019-10-17

## 2019-10-17 VITALS
BODY MASS INDEX: 41.75 KG/M2 | SYSTOLIC BLOOD PRESSURE: 142 MMHG | DIASTOLIC BLOOD PRESSURE: 78 MMHG | HEART RATE: 94 BPM | WEIGHT: 315 LBS | HEIGHT: 73 IN | OXYGEN SATURATION: 98 %

## 2019-10-17 DIAGNOSIS — I10 ESSENTIAL HYPERTENSION: Primary | ICD-10-CM

## 2019-10-17 DIAGNOSIS — R06.09 DYSPNEA ON EXERTION: ICD-10-CM

## 2019-10-17 PROCEDURE — 99213 OFFICE O/P EST LOW 20 MIN: CPT | Performed by: NURSE PRACTITIONER

## 2019-10-17 RX ORDER — DEXTROAMPHETAMINE SACCHARATE, AMPHETAMINE ASPARTATE, DEXTROAMPHETAMINE SULFATE AND AMPHETAMINE SULFATE 5; 5; 5; 5 MG/1; MG/1; MG/1; MG/1
20 TABLET ORAL DAILY
COMMUNITY

## 2019-10-17 RX ORDER — ESCITALOPRAM OXALATE 10 MG/1
10 TABLET ORAL DAILY
COMMUNITY

## 2019-10-17 RX ORDER — LISINOPRIL AND HYDROCHLOROTHIAZIDE 20; 12.5 MG/1; MG/1
1 TABLET ORAL DAILY
COMMUNITY

## 2019-10-17 NOTE — PROGRESS NOTES
Subjective:     dyspnea on exertion and Results (chief complaint)      History of Present Illness  Thank you for asking me to see Kemal Dunbar for shortness of breath.  Dyspnea has been moderate. . Episodes usually occur all day and have been stable. Associated symptoms include: cramps. The symptoms are aggravated by exercise and exertion, and relieved by rest.  The patient denies  history of VTE. The patient denies  smoking. The patient denies  history of CHF, CAD or MI. The patient denies  pulmonary conditions or exposures. The patient denies  weight gain. The patient denies  LE edema. The patient denies  PND, orthopnea. The patient reports snoring. The patient reports prior sleep evaluation. He has been diagnosed with IONA. He sees Dr. Guido. He has been on CPAP for about one year.  The patient denies  prior PFTs or pulmonary evaluation. He has not had PFTs or a CXR. He has noticed this primarily with stairs. He started riding his bike about 6 months ago. He has noticed his dyspnea with bike riding.     6MWT: Results in distance walked:448.46 m  BNP: negative  Echo: completed. Mild/mod LVH  Stress echo: completed    Review of Systems   Constitution: Negative for weakness and malaise/fatigue.   Cardiovascular: Negative for chest pain, dyspnea on exertion, orthopnea and palpitations.   Respiratory: Negative for cough and shortness of breath.    Neurological: Negative for dizziness.       Current Outpatient Medications   Medication Sig Dispense Refill   • amphetamine-dextroamphetamine (ADDERALL) 20 MG tablet Take 20 mg by mouth Daily.     • escitalopram (LEXAPRO) 10 MG tablet Take 10 mg by mouth Daily.     • lisinopril-hydrochlorothiazide (PRINZIDE,ZESTORETIC) 20-12.5 MG per tablet Take 1 tablet by mouth Daily.     • meloxicam (MOBIC) 15 MG tablet Take 15 mg by mouth Daily.  0   • omeprazole (priLOSEC) 40 MG capsule TK ONE C PO  QD PRN  0     No current facility-administered medications for this visit.        "  Objective:     Vitals:    10/17/19 1125   BP: 142/78   BP Location: Left arm   Patient Position: Sitting   Cuff Size: Adult   Pulse: 94   SpO2: 98%   Weight: (!) 147 kg (324 lb 6.4 oz)   Height: 185.4 cm (73\")          Physical Exam   Constitutional: He is oriented to person, place, and time. He appears well-developed and well-nourished. No distress.   Neck: Normal range of motion. No JVD present.   Cardiovascular: Normal rate, regular rhythm, normal heart sounds and intact distal pulses.   Pulmonary/Chest: Effort normal and breath sounds normal. No respiratory distress.   Abdominal: Soft. He exhibits no distension.   Musculoskeletal: Normal range of motion. He exhibits no edema.   Neurological: He is alert and oriented to person, place, and time.       Cardiographics  Results for orders placed in visit on 09/09/19   Adult Stress Echo W/ Cont or Stress Agent if Necessary Per Protocol    Narrative · Resting LVEF is 56-60%.  · Target heart rate achieved with atropine and dobutamine. Stress ECG   sinus tachycardia without evidence of ischemia.  · Segment augmentation had a normal response to stress. Stress LVEF   greater than 70% and hyperdynamic.  · Normal stress echo with no significant echocardiographic evidence for   myocardial ischemia.          No radiology results for the last 30 days.  Echocardiogram: 9/17/19  Interpretation Summary     · The quality of the study is limited due to poor acoustic windows related to limited views obtained and patient body habitus  · Left ventricle systolic function is normal and calculated at 67%. Mild to moderate concentric hypertrophy with normal diastolic function.  · Right ventricle systolic function is normal. Right ventricular cavity is grossly borderline dilated.  · No significant valvular abnormalities given the limitations of the study.  · Small pericardial effusion adjacent to the left ventricle, right ventricle and right atrium.       ECG:  Narrative     Test Reason : " triana  Blood Pressure : **/** mmHG  Vent. Rate : 085 BPM     Atrial Rate : 085 BPM     P-R Int : 132 ms          QRS Dur : 090 ms      QT Int : 362 ms       P-R-T Axes : 029 065 069 degrees     QTc Int : 430 ms    Normal sinus rhythm  RSR' or QR pattern in V1 suggests right ventricular conduction delay  Borderline ECG       Imaging  Chest x-ray:    Lab Review   No results found for: GLUCOSE, BUN, CREATININE, EGFRIFNONA, EGFRIFAFRI, BCR, K, CO2, CALCIUM, PROTENTOTREF, ALBUMIN, LABIL2, AST, ALT  No results found for: WBC, HGB, HCT, MCV, PLT  Lab Results   Component Value Date    CHOL 299 (H) 07/19/2018    TRIG 239 (H) 07/19/2018    HDL 56 07/19/2018     (H) 07/19/2018     No results found for: HGBA1C  No results found for: TSH  Lab Results   Component Value Date    PROBNP 114.5 09/09/2019          The following portions of the patient's history were reviewed and updated as appropriate: allergies, current medications, past family history, past medical history, past social history, past surgical history and problem list.  Old records reviewed and pertinent information is included in the above objective data.      Assessment/Plan:      Diagnosis Plan   1. Dyspnea on exertion  Stress echo, bnp, echo, PFTs, and 6MWT all completed.   No cardiac indication for TRIANA.   Mild/mod LVH on Lisinopril/HCTZ       Activity: Approximately 150 minutes of moderate activity (such as walking program)  per week (20-30 minutes most days of the week)  Diet: Heart healthy foods - more fresh fruits and vegetables and whole grains; less red meat; more fish and poultry that is baked or grilled - not fried; less salt not to exceed 2000 mg daily - less processed food; No trans or saturated fat  Non Smoker    Diabetes management  Blood pressure management  Weight management: Patient's Body mass index is 42.8 kg/m². BMI is above normal parameters. Recommendations include: educational material and exercise counseling.    Stress  management    *Disease risk for type 2 diabetes. Hypertension and cardiovascular disease discussed with the patient  * Increased waist circumference (greater than 35 inches for females and 40 inches for males) also can be a marker for increased risk, even in persons of normal weight - patient made aware of this information        Follow up with PCP for HTN.

## 2019-10-17 NOTE — PATIENT INSTRUCTIONS
Interpretation Summary     · The quality of the study is limited due to poor acoustic windows related to limited views obtained and patient body habitus  · Left ventricle systolic function is normal and calculated at 67%. Mild to moderate concentric hypertrophy with normal diastolic function.  · Right ventricle systolic function is normal. Right ventricular cavity is grossly borderline dilated.  · No significant valvular abnormalities given the limitations of the study.  · Small pericardial effusion adjacent to the left ventricle, right ventricle and right atrium.       Echo OK. No evidence of heart failure. NOrmal BNP.   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

## 2020-06-24 ENCOUNTER — OFFICE VISIT (OUTPATIENT)
Dept: SLEEP MEDICINE | Facility: HOSPITAL | Age: 50
End: 2020-06-24

## 2020-06-24 DIAGNOSIS — G47.33 OBSTRUCTIVE SLEEP APNEA, ADULT: Primary | ICD-10-CM

## 2020-06-24 PROCEDURE — 99442 PR PHYS/QHP TELEPHONE EVALUATION 11-20 MIN: CPT | Performed by: NURSE PRACTITIONER

## 2020-06-24 NOTE — PROGRESS NOTES
Sleep Clinic Follow Up      You have chosen to receive care through a telephone visit. Do you consent to use a telephone visit for your medical care today? Yes    Date: 2020  Primary Care Physician: Provider, No Known    Last office visit: 2019 (I reviewed this note)    CC: Follow up: IONA on CPAP      Interim History:  Since the last visit:    1) moderate IONA -  Kemal Dunbar has remained compliant with CPAP. He denies mask and machine issues, dry mouth, headaches, or pressures intolerance. He denies abnormal dreams, sleep paralysis, nasal congestion, URI sx. Feels as though he needs more pressure at times.    Sleep Testin. Split night PSG on 2018, AHI of 27.8   2. CPAP titration on same day, recommended 8-20 cm H2O   3. Currently on 8-20 cm H2O    PAP Data:    Time frame: 2019-2020   Compliance 100 %  Average use on days used: 8 hrs 16 min  Percent of days with usage greater than or equal to 4 hours: 99.7%  PAP range : 8-20 cm H2O  Average 90% pressure: 13.8 cmH2O  Leak: 1 minutes  Average AHI 1.6 events/hr  Mask type: Nasal pillows  DME: [facility]    Bed time: 2100  Sleep latency: 15 minutes  Number of times awakens during the night: 1  Wake time: 0230  Estimated total sleep time at night: 8 hours  Caffeine intake: 0 cups of coffee, 0 cups of tea, and 1 sodas per day  Alcohol intake: 4-5 drinks per week  Nap time: 2-3 times per week - afternoon   Sleepiness with Driving: none       2) Patient denies RLS symptoms.     PMHx, FH, SH reviewed and pertinent changes are: unchanged from last office visit on 2019 - no current PCP      REVIEW OF SYSTEMS:   Negative for chest pain, SOA, fever, chills, cough, N/V/D, abdominal pain.    Smoking:none      Exam:  Unable to perform physical exam due to conducting telephone visit    Past Medical History:   Diagnosis Date   • Heartburn    • Hypertension        Current Outpatient Medications:   •  amphetamine-dextroamphetamine  (ADDERALL) 20 MG tablet, Take 20 mg by mouth Daily., Disp: , Rfl:   •  escitalopram (LEXAPRO) 10 MG tablet, Take 10 mg by mouth Daily., Disp: , Rfl:   •  lisinopril-hydrochlorothiazide (PRINZIDE,ZESTORETIC) 20-12.5 MG per tablet, Take 1 tablet by mouth Daily., Disp: , Rfl:   •  meloxicam (MOBIC) 15 MG tablet, Take 15 mg by mouth Daily., Disp: , Rfl: 0  •  omeprazole (priLOSEC) 40 MG capsule, TK ONE C PO  QD PRN, Disp: , Rfl: 0      Assessment and Plan:    1. Obstructive sleep apnea Established, stable (1)  1. Compliant with PAP therapy  2. Continue PAP as prescribed - increase pressure to 10-20 cm H2O for comfort  3. Script for PAP supplies - discussed different mask options and alternatives for obtaining PAP supplies  4. Return to clinic in 1 year with compliance report unless change in symptoms in interim period      This visit has been rescheduled as a phone visit to comply with patient safety concerns in accordance with CDC recommendations. Total time of discussion was 11 minutes.    8 of 11 minutes spent telephone counseling patient extensively regarding:   PAP therapy, PAP compliance and PAP maintenance      RTC in 12 months. Patient agrees to return sooner if changes in symptoms.        This document has been electronically signed by ASHLEE Montero on June 24, 2020 09:58          CC: Provider, No Known          No ref. provider found

## 2021-09-09 ENCOUNTER — OFFICE VISIT (OUTPATIENT)
Dept: SLEEP MEDICINE | Facility: HOSPITAL | Age: 51
End: 2021-09-09

## 2021-09-09 VITALS
BODY MASS INDEX: 41.75 KG/M2 | SYSTOLIC BLOOD PRESSURE: 141 MMHG | HEIGHT: 73 IN | OXYGEN SATURATION: 98 % | DIASTOLIC BLOOD PRESSURE: 75 MMHG | HEART RATE: 76 BPM | WEIGHT: 315 LBS

## 2021-09-09 DIAGNOSIS — G47.33 OBSTRUCTIVE SLEEP APNEA, ADULT: Primary | ICD-10-CM

## 2021-09-09 PROCEDURE — 99212 OFFICE O/P EST SF 10 MIN: CPT | Performed by: NURSE PRACTITIONER

## 2021-09-09 NOTE — PATIENT INSTRUCTIONS
*Call [employer] to register machine's serial number: [phone]  *[employer]' website: [email or web address]      Obesity, Adult  Obesity is the condition of having too much total body fat. Being overweight or obese means that your weight is greater than what is considered healthy for your body size. Obesity is determined by a measurement called BMI. BMI is an estimate of body fat and is calculated from height and weight. For adults, a BMI of 30 or higher is considered obese.  Obesity can lead to other health concerns and major illnesses, including:  · Stroke.  · Coronary artery disease (CAD).  · Type 2 diabetes.  · Some types of cancer, including cancers of the colon, breast, uterus, and gallbladder.  · Osteoarthritis.  · High blood pressure (hypertension).  · High cholesterol.  · Sleep apnea.  · Gallbladder stones.  · Infertility problems.  What are the causes?  Common causes of this condition include:  · Eating daily meals that are high in calories, sugar, and fat.  · Being born with genes that may make you more likely to become obese.  · Having a medical condition that causes obesity, including:  ? Hypothyroidism.  ? Polycystic ovarian syndrome (PCOS).  ? Binge-eating disorder.  ? Cushing syndrome.  · Taking certain medicines, such as steroids, antidepressants, and seizure medicines.  · Not being physically active (sedentary lifestyle).  · Not getting enough sleep.  · Drinking high amounts of sugar-sweetened beverages, such as soft drinks.  What increases the risk?  The following factors may make you more likely to develop this condition:  · Having a family history of obesity.  · Being a woman of  descent.  · Being a man of  descent.  · Living in an area with limited access to:  ? [place], recreation centers, or sidewalks.  ? Healthy food choices, such as grocery stores and farmers' markets.  What are the signs or symptoms?  The main sign of this condition is having too much  body fat.  How is this diagnosed?  This condition is diagnosed based on:  · Your BMI. If you are an adult with a BMI of 30 or higher, you are considered obese.  · Your waist circumference. This measures the distance around your waistline.  · Your skinfold thickness. Your health care provider may gently pinch a fold of your skin and measure it.  You may have other tests to check for underlying conditions.  How is this treated?  Treatment for this condition often includes changing your lifestyle. Treatment may include some or all of the following:  · Dietary changes. This may include developing a healthy meal plan.  · Regular physical activity. This may include activity that causes your heart to beat faster (aerobic exercise) and strength training. Work with your health care provider to design an exercise program that works for you.  · Medicine to help you lose weight if you are unable to lose 1 pound a week after 6 weeks of healthy eating and more physical activity.  · Treating conditions that cause the obesity (underlying conditions).  · Surgery. Surgical options may include gastric banding and gastric bypass. Surgery may be done if:  ? Other treatments have not helped to improve your condition.  ? You have a BMI of 40 or higher.  ? You have life-threatening health problems related to obesity.  Follow these instructions at home:  Eating and drinking    · Follow recommendations from your health care provider about what you eat and drink. Your health care provider may advise you to:  ? Limit fast food, sweets, and processed snack foods.  ? Choose low-fat options, such as low-fat milk instead of whole milk.  ? Eat 5 or more servings of fruits or vegetables every day.  ? Eat at home more often. This gives you more control over what you eat.  ? Choose healthy foods when you eat out.  ? Learn to read food labels. This will help you understand how much food is considered 1 serving.  ? Learn what a healthy serving size  is.  ? Keep low-fat snacks available.  ? Limit sugary drinks, such as soda, fruit juice, sweetened iced tea, and flavored milk.  · Drink enough water to keep your urine pale yellow.  · Do not follow a fad diet. Fad diets can be unhealthy and even dangerous.    Physical activity  · Exercise regularly, as told by your health care provider.  ? Most adults should get up to 150 minutes of moderate-intensity exercise every week.  ? Ask your health care provider what types of exercise are safe for you and how often you should exercise.  · Warm up and stretch before being active.  · Cool down and stretch after being active.  · Rest between periods of activity.  Lifestyle  · Work with your health care provider and a dietitian to set a weight-loss goal that is healthy and reasonable for you.  · Limit your screen time.  · Find ways to reward yourself that do not involve food.  · Do not drink alcohol if:  ? Your health care provider tells you not to drink.  ? You are pregnant, may be pregnant, or are planning to become pregnant.  · If you drink alcohol:  ? Limit how much you use to:  § 0-1 drink a day for women.  § 0-2 drinks a day for men.  ? Be aware of how much alcohol is in your drink. In the [place] one drink equals one 12 oz bottle of beer (355 mL), one 5 oz glass of wine (148 mL), or one 1½ oz glass of hard liquor (44 mL).  General instructions  · Keep a weight-loss journal to keep track of the food you eat and how much exercise you get.  · Take over-the-counter and prescription medicines only as told by your health care provider.  · Take vitamins and supplements only as told by your health care provider.  · Consider joining a support group. Your health care provider may be able to recommend a support group.  · Keep all follow-up visits as told by your health care provider. This is important.  Contact a health care provider if:  · You are unable to meet your weight loss goal after 6 weeks of dietary and lifestyle  changes.  Get help right away if you are having:  · Trouble breathing.  · Suicidal thoughts or behaviors.  Summary  · Obesity is the condition of having too much total body fat.  · Being overweight or obese means that your weight is greater than what is considered healthy for your body size.  · Work with your health care provider and a dietitian to set a weight-loss goal that is healthy and reasonable for you.  · Exercise regularly, as told by your health care provider. Ask your health care provider what types of exercise are safe for you and how often you should exercise.  This information is not intended to replace advice given to you by your health care provider. Make sure you discuss any questions you have with your health care provider.  Document Revised: 08/22/2019 Document Reviewed: 08/22/2019  Elsevier Patient Education © 2021 [employer]

## 2021-09-09 NOTE — PROGRESS NOTES
Sleep Clinic Follow Up    Date: 2021  Primary Care Provider: Provider, No Known    Last office visit: 2020 (I reviewed this note)    CC: Follow up: IONA on CPAP, annual follow-up      Interim History:  Since the last visit:    1) moderate IONA -  Kemal Dunbar has remained compliant with CPAP. He denies mask and machine issues, dry mouth, headaches, or pressures intolerance. He denies abnormal dreams, sleep paralysis, nasal congestion, URI sx.      2) Patient denies RLS symptoms.         Sleep Testin. Split night PSG on 2018, AHI of 27.8   2. CPAP titration on same day, recommended 8-20 cm H2O   3. Currently on 10-20 cm H2O    PAP Data:    Time frame: 09/10/2020-2021   Compliance: 100 %  Average use on days used: 7hrs 55 min  Percent of days with usage greater than or equal to 4 hours: 97.8%  PAP range: 10-20 cm H2O  Average 90% pressure: 14.2 cmH2O  Leak: 0 minutes  Average AHI: 1.3 events/hr  Mask type: Nasal pillows  DME: [facility]    Bed time: 0300  Sleep latency: 30 minutes  Number of times awakens during the night: 2  Wake time: 1030  Estimated total sleep time at night: 6-7 hours  Caffeine intake: 0 cups of coffee, 0 cups of tea, and 0 sodas per day  Alcohol intake: 0 drinks per week  Nap time: denies    Sleepiness with Driving: denies       [place] - 3        PMHx, FH, SH reviewed and pertinent changes are:  unchanged from last office visit on 2020      REVIEW OF SYSTEMS:   Negative for chest pain, SOA, fever, chills, cough, N/V/D, abdominal pain.    Smoking:none        Exam:  Vitals:    21   BP: 141/75   Pulse: 76   SpO2: 98%           21   Weight: (!) 153 kg (337 lb 4.8 oz)     Body mass index is 44.51 kg/m². Patient's Body mass index is 44.51 kg/m². indicating that he is morbidly obese (BMI > 40 or > 35 with obesity - related health condition). Obesity-related health conditions include the following: obstructive sleep apnea and hypertension.  Obesity is unchanged. BMI is is above average; BMI management plan is completed. We discussed portion control and increasing exercise..      Gen:                No distress, conversant, pleasant, appears stated age, alert, oriented  Eyes:               Anicteric sclera, moist conjunctiva, no lid lag                           EOMI   Lungs:             normal effort, non-labored breathing                          Clear to auscultation bilaterally          CV:                  Normal S1/S2, no murmur                          no lower extremity edema                 Psych:             Appropriate affect  Neuro:             CN 2-12 appear intact    Past Medical History:   Diagnosis Date   • Heartburn    • Hypertension        Current Outpatient Medications:   •  amphetamine-dextroamphetamine (ADDERALL) 20 MG tablet, Take 20 mg by mouth Daily., Disp: , Rfl:   •  escitalopram (LEXAPRO) 10 MG tablet, Take 10 mg by mouth Daily., Disp: , Rfl:   •  lisinopril-hydrochlorothiazide (PRINZIDE,ZESTORETIC) 20-12.5 MG per tablet, Take 1 tablet by mouth Daily., Disp: , Rfl:   •  omeprazole (priLOSEC) 40 MG capsule, TK ONE C PO  QD PRN, Disp: , Rfl: 0      Assessment and Plan:    1. Obstructive sleep apnea  -Established, stable (1)  1. Compliant with PAP therapy  2. Advised patient of new safety recall of [employer] CPAP/BiPAP/AVAPS machines. We discussed the risk versus benefit of continuing usage of PAP therapy. The company has recommended that patients stop usage of their current machines. Instructed patient to call [employer] ([phone]) to check if the machine is under warranty for repair or replacement. [place] machine with serial number on website: www.[employer]/src-updates. Follow up with DME company or sleep clinic regarding any further questions, or for consideration for new machine once eligible. Advised patient to avoid unapproved ozone-type CPAP . Advised to call us if any further questions  or problems.  3. Script for PAP supplies  4. Drowsy driving tips- do not drive if feeling sleepy   5. Return to clinic in 12 months with compliance report unless change in symptoms in interim period          I spent 15 minutes caring for Kemal on this date of service. This time includes time spent by me in the following activities: preparing for the visit, obtaining and/or reviewing a separately obtained history, performing a medically appropriate examination and/or evaluation, counseling and educating the patient/family/caregiver, ordering medications, tests, or procedures and documenting information in the medical record.           This document has been electronically signed by ASHLEE Prado on September 9, 2021 09:32 CDT            CC: Provider, No Known          No ref. provider found

## 2022-01-30 ENCOUNTER — APPOINTMENT (OUTPATIENT)
Dept: CT IMAGING | Facility: HOSPITAL | Age: 52
End: 2022-01-30

## 2022-01-30 ENCOUNTER — HOSPITAL ENCOUNTER (EMERGENCY)
Facility: HOSPITAL | Age: 52
Discharge: HOME | End: 2022-01-30
Attending: EMERGENCY MEDICINE | Admitting: EMERGENCY MEDICINE

## 2022-01-30 ENCOUNTER — APPOINTMENT (OUTPATIENT)
Dept: GENERAL RADIOLOGY | Facility: HOSPITAL | Age: 52
End: 2022-01-30

## 2022-01-30 VITALS
BODY MASS INDEX: 41.75 KG/M2 | WEIGHT: 315 LBS | HEIGHT: 73 IN | HEART RATE: 79 BPM | DIASTOLIC BLOOD PRESSURE: 78 MMHG | SYSTOLIC BLOOD PRESSURE: 148 MMHG | RESPIRATION RATE: 18 BRPM | OXYGEN SATURATION: 97 % | TEMPERATURE: 98.8 F

## 2022-01-30 DIAGNOSIS — J18.9 PNEUMONIA OF RIGHT MIDDLE LOBE DUE TO INFECTIOUS ORGANISM: Primary | ICD-10-CM

## 2022-01-30 LAB
ALBUMIN SERPL-MCNC: 4.1 G/DL (ref 3.5–5.2)
ALBUMIN/GLOB SERPL: 1.6 G/DL
ALP SERPL-CCNC: 103 U/L (ref 39–117)
ALT SERPL W P-5'-P-CCNC: 26 U/L (ref 1–41)
ANION GAP SERPL CALCULATED.3IONS-SCNC: 10 MMOL/L (ref 5–15)
AST SERPL-CCNC: 24 U/L (ref 1–40)
BASOPHILS # BLD AUTO: 0.06 10*3/MM3 (ref 0–0.2)
BASOPHILS NFR BLD AUTO: 0.6 % (ref 0–1.5)
BILIRUB SERPL-MCNC: 0.4 MG/DL (ref 0–1.2)
BUN SERPL-MCNC: 9 MG/DL (ref 6–20)
BUN/CREAT SERPL: 13.2 (ref 7–25)
CALCIUM SPEC-SCNC: 8.9 MG/DL (ref 8.6–10.5)
CHLORIDE SERPL-SCNC: 104 MMOL/L (ref 98–107)
CO2 SERPL-SCNC: 22 MMOL/L (ref 22–29)
CREAT SERPL-MCNC: 0.68 MG/DL (ref 0.76–1.27)
DEPRECATED RDW RBC AUTO: 40.8 FL (ref 37–54)
EOSINOPHIL # BLD AUTO: 0.17 10*3/MM3 (ref 0–0.4)
EOSINOPHIL NFR BLD AUTO: 1.7 % (ref 0.3–6.2)
ERYTHROCYTE [DISTWIDTH] IN BLOOD BY AUTOMATED COUNT: 13.9 % (ref 12.3–15.4)
FLUAV SUBTYP SPEC NAA+PROBE: NOT DETECTED
FLUBV RNA ISLT QL NAA+PROBE: NOT DETECTED
GFR SERPL CREATININE-BSD FRML MDRD: 122 ML/MIN/1.73
GLOBULIN UR ELPH-MCNC: 2.5 GM/DL
GLUCOSE SERPL-MCNC: 104 MG/DL (ref 65–99)
HCT VFR BLD AUTO: 43.8 % (ref 37.5–51)
HGB BLD-MCNC: 15.1 G/DL (ref 13–17.7)
IMM GRANULOCYTES # BLD AUTO: 0.07 10*3/MM3 (ref 0–0.05)
IMM GRANULOCYTES NFR BLD AUTO: 0.7 % (ref 0–0.5)
LYMPHOCYTES # BLD AUTO: 0.81 10*3/MM3 (ref 0.7–3.1)
LYMPHOCYTES NFR BLD AUTO: 8.2 % (ref 19.6–45.3)
MAGNESIUM SERPL-MCNC: 2 MG/DL (ref 1.6–2.6)
MCH RBC QN AUTO: 27.9 PG (ref 26.6–33)
MCHC RBC AUTO-ENTMCNC: 34.5 G/DL (ref 31.5–35.7)
MCV RBC AUTO: 80.8 FL (ref 79–97)
MONOCYTES # BLD AUTO: 0.72 10*3/MM3 (ref 0.1–0.9)
MONOCYTES NFR BLD AUTO: 7.3 % (ref 5–12)
NEUTROPHILS NFR BLD AUTO: 8.1 10*3/MM3 (ref 1.7–7)
NEUTROPHILS NFR BLD AUTO: 81.5 % (ref 42.7–76)
NRBC BLD AUTO-RTO: 0 /100 WBC (ref 0–0.2)
NT-PROBNP SERPL-MCNC: 203.8 PG/ML (ref 0–900)
PLATELET # BLD AUTO: 295 10*3/MM3 (ref 140–450)
PMV BLD AUTO: 11.9 FL (ref 6–12)
POTASSIUM SERPL-SCNC: 4.1 MMOL/L (ref 3.5–5.2)
PROT SERPL-MCNC: 6.6 G/DL (ref 6–8.5)
RBC # BLD AUTO: 5.42 10*6/MM3 (ref 4.14–5.8)
SARS-COV-2 RNA PNL SPEC NAA+PROBE: NOT DETECTED
SODIUM SERPL-SCNC: 136 MMOL/L (ref 136–145)
TROPONIN T SERPL-MCNC: <0.01 NG/ML (ref 0–0.03)
WBC NRBC COR # BLD: 9.93 10*3/MM3 (ref 3.4–10.8)

## 2022-01-30 PROCEDURE — 93005 ELECTROCARDIOGRAM TRACING: CPT | Performed by: EMERGENCY MEDICINE

## 2022-01-30 PROCEDURE — 85025 COMPLETE CBC W/AUTO DIFF WBC: CPT | Performed by: EMERGENCY MEDICINE

## 2022-01-30 PROCEDURE — 71045 X-RAY EXAM CHEST 1 VIEW: CPT

## 2022-01-30 PROCEDURE — 25010000002 LEVOFLOXACIN PER 250 MG: Performed by: EMERGENCY MEDICINE

## 2022-01-30 PROCEDURE — 84484 ASSAY OF TROPONIN QUANT: CPT | Performed by: EMERGENCY MEDICINE

## 2022-01-30 PROCEDURE — 83880 ASSAY OF NATRIURETIC PEPTIDE: CPT | Performed by: EMERGENCY MEDICINE

## 2022-01-30 PROCEDURE — 96366 THER/PROPH/DIAG IV INF ADDON: CPT

## 2022-01-30 PROCEDURE — 80053 COMPREHEN METABOLIC PANEL: CPT | Performed by: EMERGENCY MEDICINE

## 2022-01-30 PROCEDURE — 96365 THER/PROPH/DIAG IV INF INIT: CPT

## 2022-01-30 PROCEDURE — 83735 ASSAY OF MAGNESIUM: CPT | Performed by: EMERGENCY MEDICINE

## 2022-01-30 PROCEDURE — 94799 UNLISTED PULMONARY SVC/PX: CPT

## 2022-01-30 PROCEDURE — 71275 CT ANGIOGRAPHY CHEST: CPT

## 2022-01-30 PROCEDURE — 94640 AIRWAY INHALATION TREATMENT: CPT

## 2022-01-30 PROCEDURE — 93005 ELECTROCARDIOGRAM TRACING: CPT

## 2022-01-30 PROCEDURE — 93010 ELECTROCARDIOGRAM REPORT: CPT | Performed by: INTERNAL MEDICINE

## 2022-01-30 PROCEDURE — 25010000002 METHYLPREDNISOLONE PER 125 MG: Performed by: EMERGENCY MEDICINE

## 2022-01-30 PROCEDURE — 96375 TX/PRO/DX INJ NEW DRUG ADDON: CPT

## 2022-01-30 PROCEDURE — 99284 EMERGENCY DEPT VISIT MOD MDM: CPT

## 2022-01-30 PROCEDURE — 0 IOPAMIDOL PER 1 ML: Performed by: EMERGENCY MEDICINE

## 2022-01-30 PROCEDURE — 87636 SARSCOV2 & INF A&B AMP PRB: CPT | Performed by: EMERGENCY MEDICINE

## 2022-01-30 RX ORDER — CEFPROZIL 500 MG/1
500 TABLET, FILM COATED ORAL
COMMUNITY
Start: 2022-01-29 | End: 2022-02-09

## 2022-01-30 RX ORDER — ALBUTEROL SULFATE 90 UG/1
2 AEROSOL, METERED RESPIRATORY (INHALATION) EVERY 4 HOURS PRN
Qty: 6.7 G | Refills: 0 | Status: SHIPPED | OUTPATIENT
Start: 2022-01-30

## 2022-01-30 RX ORDER — METHYLPREDNISOLONE SODIUM SUCCINATE 125 MG/2ML
125 INJECTION, POWDER, LYOPHILIZED, FOR SOLUTION INTRAMUSCULAR; INTRAVENOUS ONCE
Status: COMPLETED | OUTPATIENT
Start: 2022-01-30 | End: 2022-01-30

## 2022-01-30 RX ORDER — LEVOFLOXACIN 750 MG/1
750 TABLET ORAL DAILY
Qty: 6 TABLET | Refills: 0 | Status: SHIPPED | OUTPATIENT
Start: 2022-01-31

## 2022-01-30 RX ORDER — DEXTROMETHORPHAN HYDROBROMIDE AND PROMETHAZINE HYDROCHLORIDE 15; 6.25 MG/5ML; MG/5ML
SYRUP ORAL
COMMUNITY
Start: 2022-01-29

## 2022-01-30 RX ORDER — ALBUTEROL SULFATE 90 UG/1
2 AEROSOL, METERED RESPIRATORY (INHALATION)
Status: DISCONTINUED | OUTPATIENT
Start: 2022-01-30 | End: 2022-01-30 | Stop reason: HOSPADM

## 2022-01-30 RX ORDER — LEVOFLOXACIN 5 MG/ML
750 INJECTION, SOLUTION INTRAVENOUS ONCE
Status: COMPLETED | OUTPATIENT
Start: 2022-01-30 | End: 2022-01-30

## 2022-01-30 RX ORDER — GUAIFENESIN 600 MG/1
600 TABLET, EXTENDED RELEASE ORAL
COMMUNITY
Start: 2022-01-29 | End: 2022-02-09

## 2022-01-30 RX ORDER — IBUPROFEN 800 MG/1
800 TABLET ORAL EVERY 8 HOURS PRN
Qty: 21 TABLET | Refills: 0 | Status: SHIPPED | OUTPATIENT
Start: 2022-01-30

## 2022-01-30 RX ORDER — SODIUM CHLORIDE 0.9 % (FLUSH) 0.9 %
10 SYRINGE (ML) INJECTION AS NEEDED
Status: DISCONTINUED | OUTPATIENT
Start: 2022-01-30 | End: 2022-01-30 | Stop reason: HOSPADM

## 2022-01-30 RX ADMIN — METHYLPREDNISOLONE SODIUM SUCCINATE 125 MG: 125 INJECTION, POWDER, FOR SOLUTION INTRAMUSCULAR; INTRAVENOUS at 06:31

## 2022-01-30 RX ADMIN — IPRATROPIUM BROMIDE 2 PUFF: 17 AEROSOL, METERED RESPIRATORY (INHALATION) at 07:35

## 2022-01-30 RX ADMIN — LEVOFLOXACIN 750 MG: 5 INJECTION, SOLUTION INTRAVENOUS at 08:10

## 2022-01-30 RX ADMIN — ALBUTEROL SULFATE 2 PUFF: 90 AEROSOL, METERED RESPIRATORY (INHALATION) at 07:35

## 2022-01-30 RX ADMIN — IOPAMIDOL 85 ML: 755 INJECTION, SOLUTION INTRAVENOUS at 08:01

## 2022-02-03 LAB
QT INTERVAL: 350 MS
QTC INTERVAL: 416 MS

## 2022-09-09 ENCOUNTER — OFFICE VISIT (OUTPATIENT)
Dept: SLEEP MEDICINE | Facility: HOSPITAL | Age: 52
End: 2022-09-09

## 2022-09-09 VITALS
SYSTOLIC BLOOD PRESSURE: 167 MMHG | WEIGHT: 315 LBS | HEART RATE: 73 BPM | OXYGEN SATURATION: 95 % | BODY MASS INDEX: 41.75 KG/M2 | DIASTOLIC BLOOD PRESSURE: 83 MMHG | HEIGHT: 73 IN

## 2022-09-09 DIAGNOSIS — E66.01 MORBID OBESITY: ICD-10-CM

## 2022-09-09 DIAGNOSIS — G47.33 OBSTRUCTIVE SLEEP APNEA: Primary | ICD-10-CM

## 2022-09-09 PROCEDURE — 99213 OFFICE O/P EST LOW 20 MIN: CPT | Performed by: NURSE PRACTITIONER

## 2022-09-09 RX ORDER — MELOXICAM 15 MG/1
TABLET ORAL
COMMUNITY
Start: 2022-06-06

## 2022-09-09 RX ORDER — ATORVASTATIN CALCIUM 40 MG/1
40 TABLET, FILM COATED ORAL
COMMUNITY
Start: 2021-11-08 | End: 2022-11-09

## 2022-09-09 NOTE — PROGRESS NOTES
Sleep Clinic Follow Up    Date: 2022  Primary Care Provider: Lidya Sauer APRN    Last office visit: 2021 (I reviewed this note)    CC: Follow up: IONA on CPAP      Interim History:  Since the last visit:    1) moderate IONA -  Kemal Dunbar has remained compliant with CPAP. He denies mask and machine issues, dry mouth, headaches, or pressures intolerance. He denies abnormal dreams, sleep paralysis, nasal congestion, URI sx.    2) Patient denies RLS symptoms.     Sleep Testin. Split night PSG on 2018, AHI of 27.8   2. CPAP titration on same day, recommended 8-20 cm H2O   3. Currently on 10-20 cm H2O    PAP Data:    Time frame: 2022-2022   Compliance: 100%  Average use on days used: 8 hrs 47 min  Percent of days with usage greater than or equal to 4 hours: 98.1%  PAP range: 10-20 cm H2O  Average 90% pressure: 15.5 cmH2O  Leak: 0 minutes  Average AHI: 1.3 events/hr  Mask type: Nasal pillows  DME: [facility]  Machine type: [employer] DreamStation 2    Bed time: 0400  Sleep latency: 60 minutes  Number of times awakens during the night: 1  Wake time: 1200  Estimated total sleep time at night: 6 hours  Caffeine intake: 0 cups of coffee, 1 cups of tea, and 1 sodas per day  Alcohol intake: 0 drinks per week  Nap time: none   Sleepiness with Driving: none     Lag[place] - 2  Lag[place] Sleepiness Scale  Sitting and reading: Would never doze  Watching TV: Would never doze  Sitting, inactive in a public place (e.g. a theatre or a meeting): Would never doze  As a passenger in a car for an hour without a break: Would never doze  Lying down to rest in the afternoon when circumstances permit: Slight chance of dozing  Sitting and talking to someone: Would never doze  Sitting quietly after a lunch without alcohol: Slight chance of dozing  In a car, while stopped for a few minutes in traffic: Would never doze  Total score: 2    PMHx, FH, SH reviewed and pertinent changes are: Reportedly  unchanged from last office visit with us.      Review of Systems:   Negative for chest pain, SOA, fever, chills, cough, N/V/D, abdominal pain.    Smoking:none    Kemal Dunbar  reports that he has never smoked. He has never used smokeless tobacco.    Physical Exam:  Vitals:    09/09/22 1126   BP: 167/83   Pulse: 73   SpO2: 95%           09/09/22  1126   Weight: (!) 156 kg (343 lb)     Body mass index is 45.26 kg/m². Class 3 Severe Obesity (BMI >=40). Obesity-related health conditions include the following: obstructive sleep apnea, hypertension and dyslipidemias. Obesity is unchanged. BMI is is above average; BMI management plan is completed. I recommend portion control and increasing exercise.    Gen:                No distress, conversant, pleasant, appears stated age, alert, oriented  Eyes:               Anicteric sclera, moist conjunctiva, no lid lag                           PERRL, EOMI   Heent:             NC/AT                          Oropharynx clear                          Normal hearing  Lungs:             Normal effort, non-labored breathing      CV:                  Normal S1/S2                          No lower extremity edema  ABD:               Rounded, non-distended                  Psych:             Appropriate affect  Neuro:             CN 2-12 appear intact    Past Medical History:   Diagnosis Date   • Heartburn    • Hypertension        Current Outpatient Medications:   •  albuterol sulfate  (90 Base) MCG/ACT inhaler, Inhale 2 puffs Every 4 (Four) Hours As Needed for Wheezing or Shortness of Air., Disp: 6.7 g, Rfl: 0  •  atorvastatin (LIPITOR) 40 MG tablet, Take 40 mg by mouth., Disp: , Rfl:   •  ibuprofen (ADVIL,MOTRIN) 800 MG tablet, Take 1 tablet by mouth Every 8 (Eight) Hours As Needed for Moderate Pain  or Fever., Disp: 21 tablet, Rfl: 0  •  lisinopril-hydrochlorothiazide (PRINZIDE,ZESTORETIC) 20-12.5 MG per tablet, Take 1 tablet by mouth Daily., Disp: , Rfl:   •  meloxicam  (MOBIC) 15 MG tablet, TAKE 1 TABLET BY MOUTH DAILY FOR ARTHRITIS, Disp: , Rfl:   •  omeprazole (priLOSEC) 40 MG capsule, TK ONE C PO  QD PRN, Disp: , Rfl: 0  •  amphetamine-dextroamphetamine (ADDERALL) 20 MG tablet, Take 20 mg by mouth Daily., Disp: , Rfl:   •  escitalopram (LEXAPRO) 10 MG tablet, Take 10 mg by mouth Daily., Disp: , Rfl:   •  levoFLOXacin (LEVAQUIN) 750 MG tablet, Take 1 tablet by mouth Daily., Disp: 6 tablet, Rfl: 0  •  promethazine-dextromethorphan (PROMETHAZINE-DM) 6.25-15 MG/5ML syrup, Take 2 tsp at bedtime p.r.n. Cough, Disp: , Rfl:     WBC   Date Value Ref Range Status   01/30/2022 9.93 3.40 - 10.80 10*3/mm3 Final     RBC   Date Value Ref Range Status   01/30/2022 5.42 4.14 - 5.80 10*6/mm3 Final     Hemoglobin   Date Value Ref Range Status   01/30/2022 15.1 13.0 - 17.7 g/dL Final     Hematocrit   Date Value Ref Range Status   01/30/2022 43.8 37.5 - 51.0 % Final     MCV   Date Value Ref Range Status   01/30/2022 80.8 79.0 - 97.0 fL Final     MCH   Date Value Ref Range Status   01/30/2022 27.9 26.6 - 33.0 pg Final     MCHC   Date Value Ref Range Status   01/30/2022 34.5 31.5 - 35.7 g/dL Final     RDW   Date Value Ref Range Status   01/30/2022 13.9 12.3 - 15.4 % Final     RDW-SD   Date Value Ref Range Status   01/30/2022 40.8 37.0 - 54.0 fl Final     MPV   Date Value Ref Range Status   01/30/2022 11.9 6.0 - 12.0 fL Final     Platelets   Date Value Ref Range Status   01/30/2022 295 140 - 450 10*3/mm3 Final     Neutrophil %   Date Value Ref Range Status   01/30/2022 81.5 (H) 42.7 - 76.0 % Final     Lymphocyte %   Date Value Ref Range Status   01/30/2022 8.2 (L) 19.6 - 45.3 % Final     Monocyte %   Date Value Ref Range Status   01/30/2022 7.3 5.0 - 12.0 % Final     Eosinophil %   Date Value Ref Range Status   01/30/2022 1.7 0.3 - 6.2 % Final     Basophil %   Date Value Ref Range Status   01/30/2022 0.6 0.0 - 1.5 % Final     Immature Grans %   Date Value Ref Range Status   01/30/2022 0.7 (H) 0.0 -  0.5 % Final     Neutrophils, Absolute   Date Value Ref Range Status   01/30/2022 8.10 (H) 1.70 - 7.00 10*3/mm3 Final     Lymphocytes, Absolute   Date Value Ref Range Status   01/30/2022 0.81 0.70 - 3.10 10*3/mm3 Final     Monocytes, Absolute   Date Value Ref Range Status   01/30/2022 0.72 0.10 - 0.90 10*3/mm3 Final     Eosinophils, Absolute   Date Value Ref Range Status   01/30/2022 0.17 0.00 - 0.40 10*3/mm3 Final     Basophils, Absolute   Date Value Ref Range Status   01/30/2022 0.06 0.00 - 0.20 10*3/mm3 Final     Immature Grans, Absolute   Date Value Ref Range Status   01/30/2022 0.07 (H) 0.00 - 0.05 10*3/mm3 Final     nRBC   Date Value Ref Range Status   01/30/2022 0.0 0.0 - 0.2 /100 WBC Final       Lab Results   Component Value Date    GLUCOSE 104 (H) 01/30/2022    BUN 9 01/30/2022    CREATININE 0.68 (L) 01/30/2022    EGFRIFNONA 122 01/30/2022    BCR 13.2 01/30/2022    K 4.1 01/30/2022    CO2 22.0 01/30/2022    CALCIUM 8.9 01/30/2022    ALBUMIN 4.10 01/30/2022    AST 24 01/30/2022    ALT 26 01/30/2022       Assessment and Plan:    1. Obstructive sleep apnea - Established, stable (1)  1. Compliant with PAP therapy  2. Continue PAP as prescribed  3. Adjust pressure to 14-20 cm H2O per patient preference, turn ramp off  4. Script for PAP supplies  5. Return to clinic in 1 year with compliance report unless change in symptoms in interim period  2. Morbid obesity - BMI 45.3 - stable chronic illness      I spent 15 minutes caring for Kemal on this date of service. This time includes time spent by me in the following activities: preparing for the visit, reviewing tests, obtaining and/or reviewing a separately obtained history, performing a medically appropriate examination and/or evaluation , counseling and educating the patient/family/caregiver, documenting information in the medical record and care coordination; discussing PAP therapy, PAP compliance and PAP maintenance    RTC in 12 months. Patient agrees to return  sooner if changes in symptoms.        This document has been electronically signed by ASHLEE Wolf on September 9, 2022 11:56 CDT          CC: Lidya Sauer APRN          No ref. provider found

## 2023-08-23 DIAGNOSIS — M25.561 RIGHT KNEE PAIN, UNSPECIFIED CHRONICITY: Primary | ICD-10-CM

## 2023-08-29 ENCOUNTER — OFFICE VISIT (OUTPATIENT)
Dept: ORTHOPEDIC SURGERY | Facility: CLINIC | Age: 53
End: 2023-08-29
Payer: COMMERCIAL

## 2023-08-29 VITALS — HEIGHT: 73 IN | BODY MASS INDEX: 41.75 KG/M2 | WEIGHT: 315 LBS

## 2023-08-29 DIAGNOSIS — G89.29 CHRONIC PAIN OF RIGHT KNEE: ICD-10-CM

## 2023-08-29 DIAGNOSIS — I10 PRIMARY HYPERTENSION: ICD-10-CM

## 2023-08-29 DIAGNOSIS — M17.11 PRIMARY OSTEOARTHRITIS OF RIGHT KNEE: Primary | ICD-10-CM

## 2023-08-29 DIAGNOSIS — E66.01 MORBID OBESITY WITH BMI OF 40.0-44.9, ADULT: ICD-10-CM

## 2023-08-29 DIAGNOSIS — M25.561 CHRONIC PAIN OF RIGHT KNEE: ICD-10-CM

## 2023-08-29 PROCEDURE — 99204 OFFICE O/P NEW MOD 45 MIN: CPT | Performed by: ORTHOPAEDIC SURGERY

## 2023-08-29 RX ORDER — CELECOXIB 200 MG/1
200 CAPSULE ORAL DAILY
Qty: 30 CAPSULE | Refills: 0 | Status: SHIPPED | OUTPATIENT
Start: 2023-08-29

## 2023-08-29 NOTE — PROGRESS NOTES
Kemal Dunbar is a 52 y.o. male   Primary provider:  Lidya Sauer APRN       Chief Complaint   Patient presents with    Right Knee - Pain    Establish Care       HISTORY OF PRESENT ILLNESS:  Patient reports that he has been having pain for approximately 2 years involving the right knee.  Mostly he has dull achy pain but he has occasional sharp stabbing pains.  Pain is worse with increased activity.  He reports that he has stiffness with prolonged sitting.  He has difficulty with ladders and with stairs.  He has pain with activities of daily living.  He has been on meloxicam which has not provided any significant improvement.    Pain  This is a chronic problem. The current episode started more than 1 year ago. Associated symptoms comments: Crushing, stabbing, aching, burning, swelling. The symptoms are aggravated by walking (sitting). He has tried acetaminophen, NSAIDs, ice, heat and rest (injections) for the symptoms.      CONCURRENT MEDICAL HISTORY:    Past Medical History:   Diagnosis Date    Heartburn     Hypertension        No Known Allergies      Current Outpatient Medications:     albuterol sulfate  (90 Base) MCG/ACT inhaler, Inhale 2 puffs Every 4 (Four) Hours As Needed for Wheezing or Shortness of Air., Disp: 6.7 g, Rfl: 0    escitalopram (LEXAPRO) 10 MG tablet, Take 1 tablet by mouth Daily., Disp: , Rfl:     levoFLOXacin (LEVAQUIN) 750 MG tablet, Take 1 tablet by mouth Daily., Disp: 6 tablet, Rfl: 0    lisinopril-hydrochlorothiazide (PRINZIDE,ZESTORETIC) 20-12.5 MG per tablet, Take 1 tablet by mouth Daily., Disp: , Rfl:     omeprazole (priLOSEC) 40 MG capsule, TK ONE C PO  QD PRN, Disp: , Rfl: 0    celecoxib (CeleBREX) 200 MG capsule, Take 1 capsule by mouth Daily., Disp: 30 capsule, Rfl: 0    Past Surgical History:   Procedure Laterality Date    APPENDECTOMY      ELBOW PROCEDURE      surgery to include pins    SHOULDER SURGERY         Family History   Problem Relation Age of Onset    No  "Known Problems Mother     Prostate cancer Father     No Known Problems Brother     Hypertension Other        Social History     Socioeconomic History    Marital status:    Tobacco Use    Smoking status: Never    Smokeless tobacco: Never   Substance and Sexual Activity    Alcohol use: Not Currently     Comment: occasionally    Drug use: No    Sexual activity: Defer        Review of Systems   Constitutional: Negative.    HENT: Negative.     Eyes: Negative.    Respiratory: Negative.     Cardiovascular: Negative.    Gastrointestinal: Negative.    Endocrine: Negative.    Genitourinary: Negative.    Musculoskeletal:         Right knee pain   Skin: Negative.    Allergic/Immunologic: Negative.    Neurological: Negative.    Hematological: Negative.    Psychiatric/Behavioral: Negative.       PHYSICAL EXAMINATION:       Ht 185.4 cm (73\")   Wt (!) 151 kg (332 lb)   BMI 43.80 kg/mý     Physical Exam  Constitutional:       General: He is not in acute distress.     Appearance: He is obese.   Pulmonary:      Effort: Pulmonary effort is normal. No respiratory distress.   Neurological:      Mental Status: He is alert and oriented to person, place, and time.       GAIT:     []  Normal  [x]  Antalgic    Assistive device: [x]  None  []  Walker     []  Crutches  []  Cane     []  Wheelchair  []  Stretcher    Right Knee Exam     Muscle Strength   The patient has normal right knee strength.    Tenderness   Right knee tenderness location: diffuse.    Range of Motion   Extension:  -5   Flexion:  110     Tests   Varus: negative Valgus: negative  Drawer:  Anterior - negative    Posterior - negative    Other   Sensation: normal  Pulse: present  Swelling: mild    Comments:  Crepitation on motion.  Mild to moderate pain through arc of motion                XR Knee 1 or 2 View Right    Result Date: 8/29/2023  Narrative: Ordering Provider:  Fabian Burns MD Ordering Diagnosis/Indication:  Right knee pain, unspecified chronicity " Procedure:  XR KNEE 1 OR 2 VW RIGHT Exam Date:  8/29/23 COMPARISON:  Not applicable, no relevant images available.     Impression:  AP bilateral standing of the knees with lateral of the right knee show severe degenerative changes in the right knee with complete medial joint space collapse and bone-on-bone findings.  Severe arthritic changes are also noted in the patellofemoral compartment of the right knee.  No acute findings.  The left knee shows moderate varus positioning and moderate medial joint space narrowing with no significant marginal osteophytes present. Severe end stage osteoarthritic changes of the right knee. Fabian Burns MD 8/29/23          ASSESSMENT:    Diagnoses and all orders for this visit:    Primary osteoarthritis of right knee    Chronic pain of right knee  -     Ambulatory Referral to Physical Therapy    Primary hypertension    Morbid obesity with BMI of 40.0-44.9, adult    Other orders  -     celecoxib (CeleBREX) 200 MG capsule; Take 1 capsule by mouth Daily.          PLAN    Long discussion was held the patient regarding further treatment options.  He has significant osteoarthritis in both knees with the right side being worse than the left.  His BMI is currently 43.8.  We discussed healthy lifestyle choices and the importance of body mass index in relation to total joint arthroplasty surgery.  He is going to progress with general strength and conditioning and work on weight management.  We discussed slowly increasing activity as tolerated.  We also discussed beginning Celebrex for anti-inflammatory medication instead of meloxicam.  Recheck in 8 weeks to further the discussion for possible surgical intervention.    PT:  ROM/STR bilateral knees  2-3 visits for HEP  Eventual TKA  Progress as tolerated    Return in about 8 weeks (around 10/24/2023) for recheck.    Fabian Burns MD

## 2023-09-06 ENCOUNTER — HOSPITAL ENCOUNTER (OUTPATIENT)
Dept: PHYSICAL THERAPY | Facility: HOSPITAL | Age: 53
Setting detail: THERAPIES SERIES
Discharge: HOME | End: 2023-09-06
Payer: COMMERCIAL

## 2023-09-06 DIAGNOSIS — G89.29 CHRONIC PAIN OF RIGHT KNEE: Primary | ICD-10-CM

## 2023-09-06 DIAGNOSIS — M25.561 CHRONIC PAIN OF RIGHT KNEE: Primary | ICD-10-CM

## 2023-09-06 PROCEDURE — 97161 PT EVAL LOW COMPLEX 20 MIN: CPT

## 2023-09-06 NOTE — THERAPY EVALUATION
Outpatient Physical Therapy Ortho Initial Evaluation  [facility]     Patient Name: Kemal Dunbar  : 1970  MRN: [id number]  Today's Date: 2023      Visit Date: 2023  Attendance:  (3 approved)  Subjective Improvement: n/a  Next MD Visit: 10/24/23  Recert Date: 10/6/23    Therapy Diagnosis: Chronic R knee pain      Patient Active Problem List   Diagnosis    Anxiety    Attention-deficit hyperactivity disorder, predominantly hyperactive type    HTN (hypertension)    Hyperlipidemia    Obstructive sleep apnea, adult    Dyspnea on exertion    Class 3 severe obesity due to excess calories with serious comorbidity and body mass index (BMI) of 40.0 to 44.9 in adult        Past Medical History:   Diagnosis Date    Heartburn     Hypertension         Past Surgical History:   Procedure Laterality Date    APPENDECTOMY      ELBOW PROCEDURE      surgery to include pins    SHOULDER SURGERY         Current Outpatient Medications:     albuterol sulfate  (90 Base) MCG/ACT inhaler, Inhale 2 puffs Every 4 (Four) Hours As Needed for Wheezing or Shortness of Air., Disp: 6.7 g, Rfl: 0    celecoxib (CeleBREX) 200 MG capsule, Take 1 capsule by mouth Daily., Disp: 30 capsule, Rfl: 0    escitalopram (LEXAPRO) 10 MG tablet, Take 1 tablet by mouth Daily., Disp: , Rfl:     levoFLOXacin (LEVAQUIN) 750 MG tablet, Take 1 tablet by mouth Daily., Disp: 6 tablet, Rfl: 0    lisinopril-hydrochlorothiazide (PRINZIDE,ZESTORETIC) 20-12.5 MG per tablet, Take 1 tablet by mouth Daily., Disp: , Rfl:     omeprazole (priLOSEC) 40 MG capsule, TK ONE C PO  QD PRN, Disp: , Rfl: 0    No Known Allergies      Visit Dx:     ICD-10-CM ICD-9-CM   1. Chronic pain of right knee  M25.561 719.46    G89.29 338.29          Patient History       Row Name 23 1100             History    Chief Complaint Pain  -      Type of Pain Knee pain  right  -      Date Current Problem(s) Began --  2 years  -      Brief Description of Current  Complaint Pt reports that he has been experiencing R knee pain for about 2 years with no known LEONARDA. No prior hx of R knee injuries or surgeries. Pt reports his symptoms have gradually progressed over time.  male living with his spouse in a multi-level home. Pt also has stairs at work. Pt reports that there plans to have surgery sometime in the future.  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 Hobbies: cycling, golf  -      What clinical tests have you had for this problem? X-ray  -      Results of Clinical Tests Per knee x-ray on 8/29/23: “AP bilateral standing of the knees with lateral of the right knee show severe degenerative changes in the right knee with complete medial joint space collapse and bone-on-bone findings.  Severe arthritic changes are also noted in the patellofemoral compartment of the right knee.  No acute findings.  The left knee shows moderate varus positioning and moderate medial joint space narrowing with no significant marginal osteophytes present. Severe end stage osteoarthritic changes of the right knee.“  -         Pain     Pain Location Knee  right  -      Pain at Present 6  -      Pain at Best 6  -      Pain at Worst 8  -      Pain Frequency Constant/continuous  -      Pain Description Dull;Squeezing  jarring  -      What Performance Factors Make the Current Problem(s) WORSE? stairs, sit<>stand, getting in/out of a vehicle, holding/carrying anything with weight, prolonged standing, and prolonged walking.  -      What Performance Factors Make the Current Problem(s) BETTER? Rest, ice, getting in pool, hot tub  -      Tolerance Time- Standing 10 min  -      Tolerance Time- Walking 5 min  -      Is your  sleep disturbed? Yes  -      Is medication used to assist with sleep? No  -      Difficulties at work? stairs, prolonged walking, standing, lifting, pushing, pulling, climbing ladder  -      Difficulties with ADL's? socks and shoes  -      Difficulties with recreational activities? limited/unable  -         Fall Risk Assessment    Any falls in the past year: No  -      Number of falls reported in the last 12 months several close calls  -                User Key  (r) = Recorded By, (t) = Taken By, (c) = Cosigned By      Initials Name Provider Type     Cassie Limon, PT Physical Therapist                     PT Ortho       Row Name 09/06/23 1100       Subjective Comments    Subjective Comments See therapy pt hx  -       Precautions and Contraindications    Contraindications Per referral note: “ROM/STR bilateral knees. 2-3 visits for HEP. Eventual TKA. Progress as tolerated“  -       Subjective Pain    Able to rate subjective pain? yes  -    Pre-Treatment Pain Level 6  -    Post-Treatment Pain Level 7  -       Posture/Observations    Posture/Observations Comments Posture: weight shift over left LE, toe touch weight bearing with RLE, bilat LE ER. TTP: med and lat joint line (Lat>med), lat HS tendon, quad tendon and distal quad. (+) painful arc, crepitus present.  -       Knee Special Tests    Anterior drawer (ACL lesion) Negative  -    Posterior drawer (PCL lesion) Negative  -    Valgus stress (MCL lesion) Negative  -    Varus stress (LCL lesion) Negative  -       General ROM    RT Lower Ext Rt Knee Extension/Flexion  -       Right Lower Ext    Rt Knee Extension/Flexion AROM 0- deg  -       MMT (Manual Muscle Testing)    Rt Lower Ext Rt Hip Flexion;Rt Hip Extension;Rt Hip ABduction;Rt Hip ADduction;Rt Hip Internal (Medial) Rotation;Rt Hip External (Lateral) Rotation;Rt Knee Extension;Rt Knee Flexion;Rt Ankle Dorsiflexion  -    Lt Lower Ext Comments  -       MMT Right  Lower Ext    Rt Hip Flexion MMT, Gross Movement (5/5) normal  -MH    Rt Hip Extension MMT, Gross Movement (5/5) normal  -MH    Rt Hip ABduction MMT, Gross Movement (5/5) normal  -MH    Rt Hip ADduction MMT, Gross Movement (5/5) normal  -MH    Rt Hip Internal (Medial) Rotation MMT, Gross Movement (5/5) normal  -MH    Rt Hip External (Lateral) Rotation MMT, Gross Movement (5/5) normal  -MH    Rt Knee Extension MMT, Gross Movement (4+/5) good plus  -MH    Rt Knee Flexion MMT, Gross Movement (4+/5) good plus  -MH    Rt Ankle Dorsiflexion MMT, Gross Movement (5/5) normal  -MH       MMT Left Lower Ext    Lt Lower Extremity Comments  5/5 grossly  -MH       Sensation    Sensation WNL? WNL  -MH    Light Touch No apparent deficits  -MH       Flexibility    Flexibility Tested? Lower Extremity  -MH       Lower Extremity Flexibility    Hamstrings Moderately limited  -MH    Gastrocnemius Moderately limited  -MH    Soleus Mildly limited  -              User Key  (r) = Recorded By, (t) = Taken By, (c) = Cosigned By      Initials Name Provider Type    Cassie Schaefer, PT Physical Therapist                                Therapy Education  Education Details: HEP: HS S, gastroc S, QS, heel slides)  Given: HEP  Program: New  How Provided: Verbal, Written, Demonstration  Provided to: Patient  Level of Understanding: Verbalized, Demonstrated      PT OP Goals       Row Name 09/06/23 1100          PT Short Term Goals    STG Date to Achieve 10/06/23  -     STG 1 Pt will be independent with final HEP for self-management of symptoms.  -     STG 1 Progress New  -     STG 2 Pt's knee ext AROM will improve to lacking only 5 deg.  -     STG 2 Progress New  -     STG 3 Pt’s knee flex/ext MMT will improve to 5/5 when measured at 90 deg and 45 deg flex as a measure of improved strength.  -     STG 3 Progress New  -        Time Calculation    PT Goal Re-Cert Due Date 10/06/23  -               User Key  (r) = Recorded By, (t) =  Taken By, (c) = Cosigned By      Initials Name Provider Type     Cassie Limon, PT Physical Therapist                     PT Assessment/Plan       Row Name 09/06/23 1100          PT Assessment    Functional Limitations Impaired gait;Impaired locomotion;Performance in leisure activities;Performance in self-care ADL;Performance in work activities  -     Impairments Balance;Gait;Impaired flexibility;Impaired muscle endurance;Impaired muscle length;Impaired muscle power;Joint integrity;Joint mobility;Muscle strength;Pain;Posture;Range of motion  -     Assessment Comments Mr. Dunbar is a 51 y/o male who presents to physical therapy with a 2-year history of right knee pain with no known LEONARDA. Pt reports currently experiencing pain/difficulty with stairs, sit<>stand, getting in/out of a vehicle, holding/carrying anything with weight, prolonged standing, and prolonged walking. Upon evaluation, he presents with pain, TTP, decreased ROM, slight knee weakness, decreased flexibility, painful arc, crepitus, and gait deviations. Mr. Dunbar would benefit from skilled physical therapy to address the above deficits in order for him to be as independent as possible, improve his quality of life, and improve his outcomes if/when he has surgery.  -     Rehab Potential Good  -     Patient/caregiver participated in establishment of treatment plan and goals Yes  -     Patient would benefit from skilled therapy intervention Yes  -        PT Plan    PT Frequency 1x/week  -     Predicted Duration of Therapy Intervention (PT) 3 visits  -     Planned CPT's? PT EVAL LOW COMPLEXITY: 03326;PT RE-EVAL: 34603;PT THER PROC EA 15 MIN: 42621;PT THER ACT EA 15 MIN: 31102;PT MANUAL THERAPY EA 15 MIN: 46391;PT GAIT TRAINING EA 15 MIN: 31727;PT SELF CARE/HOME MGMT/TRAIN EA 15: 37913;PT HOT/COLD PACK WC NONMCARE: 30987;PT ELECTRICAL STIM UNATTEND: ;PT THER SUPP EA 15 MIN  -     PT Plan Comments Update HEP each visit. Work on ROM, pain  management, stretching, knee stability, knee strength, and gait mechanics. Modalities and manual as needed.  -               User Key  (r) = Recorded By, (t) = Taken By, (c) = Cosigned By      Initials Name Provider Type    Cassie Schaefre PT Physical Therapist                       OP Exercises       Row Name 09/06/23 1100             Subjective Comments    Subjective Comments See therapy pt hx  -MH         Subjective Pain    Able to rate subjective pain? yes  -      Pre-Treatment Pain Level 6  -      Post-Treatment Pain Level 7  -         Exercise 1    Exercise Name 1 Eval and POC  -         Exercise 2    Exercise Name 2 Supine HS S with towel behind thigh  -MH      Sets 2 1  -MH      Time 2 30 sec hold  -MH         Exercise 3    Exercise Name 3 Long sitting gastroc S with towel (pillow under knee to promote muscle relaxation)  -      Sets 3 1  -MH      Time 3 30 sec hold  -MH         Exercise 4    Exercise Name 4 Mini quad set  -      Sets 4 1  -MH      Reps 4 5  -MH      Time 4 no hold  -         Exercise 5    Exercise Name 5 Heel slides with strap (demo only)  -                User Key  (r) = Recorded By, (t) = Taken By, (c) = Cosigned By      Initials Name Provider Type    Cassie Schaefer PT Physical Therapist                                  Outcome Measure Options: Lower Extremity Functional Scale (LEFS)  Lower Extremity Functional Index  Any of your usual work, housework or school activities: Moderate difficulty  Your usual hobbies, recreational or sporting activities: Quite a bit of difficulty  Getting into or out of the bath: A little bit of difficulty  Walking between rooms: A little bit of difficulty  Putting on your shoes or socks: Quite a bit of difficulty  Squatting: Quite a bit of difficulty  Lifting an object, like a bag of groceries from the floor: A little bit of difficulty  Performing light activities around your home: A little bit of difficulty  Performing heavy  activities around your home: Quite a bit of difficulty  Getting into or out of a car: Moderate difficulty  Walking 2 blocks: Moderate difficulty  Walking a mile: Quite a bit of difficulty  Going up or down 10 stairs (about 1 flight of stairs): Moderate difficulty  Standing for 1 hour: Moderate difficulty  Sitting for 1 hour: A little bit of difficulty  Running on even ground: Extreme difficulty or unable to perform activity  Running on uneven ground: Extreme difficulty or unable to perform activity  Making sharp turns while running fast: Extreme difficulty or unable to perform activity  Hopping: Quite a bit of difficulty  Rolling over in bed: No difficulty  Total: 35  Lower Extremity Functional Index  Any of your usual work, housework or school activities: Moderate difficulty  Your usual hobbies, recreational or sporting activities: Quite a bit of difficulty  Getting into or out of the bath: A little bit of difficulty  Walking between rooms: A little bit of difficulty  Putting on your shoes or socks: Quite a bit of difficulty  Squatting: Quite a bit of difficulty  Lifting an object, like a bag of groceries from the floor: A little bit of difficulty  Performing light activities around your home: A little bit of difficulty  Performing heavy activities around your home: Quite a bit of difficulty  Getting into or out of a car: Moderate difficulty  Walking 2 blocks: Moderate difficulty  Walking a mile: Quite a bit of difficulty  Going up or down 10 stairs (about 1 flight of stairs): Moderate difficulty  Standing for 1 hour: Moderate difficulty  Sitting for 1 hour: A little bit of difficulty  Running on even ground: Extreme difficulty or unable to perform activity  Running on uneven ground: Extreme difficulty or unable to perform activity  Making sharp turns while running fast: Extreme difficulty or unable to perform activity  Hopping: Quite a bit of difficulty  Rolling over in bed: No difficulty  Total: 35      Time  Calculation:     Start Time: 1104  Stop Time: 1151  Time Calculation (min): 47 min  Untimed Charges  PT Eval/Re-eval Minutes: 47  Total Minutes  Untimed Charges Total Minutes: 47   Total Minutes: 47     Therapy Charges for Today       Code Description Service Date Service Provider Modifiers Qty    36193298899 HC PT EVAL LOW COMPLEXITY 4 9/6/2023 Cassie Limon, CARLTON GP 1            PT G-Codes  Outcome Measure Options: Lower Extremity Functional Scale (LEFS)  Total: 35         Cassie Limon PT, DPT  9/6/2023

## 2023-09-13 ENCOUNTER — HOSPITAL ENCOUNTER (OUTPATIENT)
Dept: PHYSICAL THERAPY | Facility: HOSPITAL | Age: 53
Setting detail: THERAPIES SERIES
Discharge: HOME | End: 2023-09-13
Payer: COMMERCIAL

## 2023-09-13 DIAGNOSIS — G89.29 CHRONIC PAIN OF RIGHT KNEE: Primary | ICD-10-CM

## 2023-09-13 DIAGNOSIS — M25.561 CHRONIC PAIN OF RIGHT KNEE: Primary | ICD-10-CM

## 2023-09-13 PROCEDURE — 97110 THERAPEUTIC EXERCISES: CPT

## 2023-09-13 NOTE — THERAPY TREATMENT NOTE
Outpatient Physical Therapy Ortho Treatment Note  [facility]     Patient Name: Kemal Dunbar  : 1970  MRN: [id number]  Today's Date: 2023      Visit Date: 2023  Subjective Improvement: n/a  MD visit: 10/24/23  Visit Number: 2/  Total Approved:3  Recert Date: 10/6/23  Visit Dx:    ICD-10-CM ICD-9-CM   1. Chronic pain of right knee  M25.561 719.46    G89.29 338.29       Patient Active Problem List   Diagnosis    Anxiety    Attention-deficit hyperactivity disorder, predominantly hyperactive type    HTN (hypertension)    Hyperlipidemia    Obstructive sleep apnea, adult    Dyspnea on exertion    Class 3 severe obesity due to excess calories with serious comorbidity and body mass index (BMI) of 40.0 to 44.9 in adult        Past Medical History:   Diagnosis Date    Heartburn     Hypertension         Past Surgical History:   Procedure Laterality Date    APPENDECTOMY      ELBOW PROCEDURE      surgery to include pins    SHOULDER SURGERY          PT Ortho       Row Name 23 1100       Subjective    Subjective Comments pt reports only 3 visits to get HEP. pt will be having a TKE done soon.  -TL       Precautions and Contraindications    Contraindications Per referral note: “ROM/STR bilateral knees. 2-3 visits for HEP. Eventual TKA. Progress as tolerated“  -TL       Subjective Pain    Post-Treatment Pain Level --  pt left without pain rating but did take ice to go.  -TL              User Key  (r) = Recorded By, (t) = Taken By, (c) = Cosigned By      Initials Name Provider Type    TL Mckayla Reed PTA Physical Therapist Assistant                                 PT Assessment/Plan       Row Name 23 1100          PT Assessment    Assessment Comments pt given updated HEP to work on strengthening and rom to prepare for TKE. Pt instructed pt on icing for pain and swelling along with strengthening ex. Pt given copies of HEP with pictures and written instructions. Pt agreed to work on HEP  to prepare for surgery.  -TL        PT Plan    PT Frequency 1x/week  -TL     Predicted Duration of Therapy Intervention (PT) 3 visits  -TL     PT Plan Comments d/c pt next visit with standing ex ckc next visit.  -TL               User Key  (r) = Recorded By, (t) = Taken By, (c) = Cosigned By      Initials Name Provider Type    Mckayla Bland PTA Physical Therapist Assistant                       OP Exercises       Row Name 09/13/23 1100             Subjective    Subjective Comments pt reports only 3 visits to get HEP. pt will be having a TKE done soon.  -TL         Subjective Pain    Able to rate subjective pain? yes  -TL      Pre-Treatment Pain Level 6  -TL      Post-Treatment Pain Level --  pt left without pain rating but did take ice to go.  -TL         Exercise 1    Exercise Name 1 pro ll level 1 for ROM  -TL      Time 1 10 mins  -TL         Exercise 2    Exercise Name 2 standing ham S  -TL      Sets 2 2  -TL      Time 2 30 sec hold  -TL         Exercise 3    Exercise Name 3 incline S  -TL      Sets 3 2  -TL      Time 3 30 sec hold  -TL         Exercise 4    Exercise Name 4 quad Set  -TL      Reps 4 15  -TL         Exercise 5    Exercise Name 5 propping for ext  -TL      Time 5 1 mins  -TL         Exercise 6    Exercise Name 6 SLR fwd  -TL      Sets 6 2  -TL      Reps 6 10  -TL      Time 6 5 sec hold  -TL         Exercise 7    Exercise Name 7 SL SLR abd  -TL      Sets 7 1  -TL      Reps 7 10  -TL      Time 7 5 sec hold  -TL         Exercise 8    Exercise Name 8 prone hip ext  -TL      Sets 8 1  -TL      Reps 8 10  -TL         Exercise 9    Exercise Name 9 SL SLR hip add  -TL      Sets 9 1  -TL      Reps 9 10  -TL                User Key  (r) = Recorded By, (t) = Taken By, (c) = Cosigned By      Initials Name Provider Type    Mckayla Bland PTA Physical Therapist Assistant                                     Therapy Education  Education Details: 4-way SLR, propping for ext, ice  Given: HEP,  Symptoms/condition management, Pain management, Posture/body mechanics  Program: New, Reinforced  How Provided: Verbal, Demonstration, Written  Provided to: Patient  Level of Understanding: Teach back education performed, Verbalized, Demonstrated              Time Calculation:   Start Time: 1100  Stop Time: 1141  Time Calculation (min): 41 min  Therapy Charges for Today       Code Description Service Date Service Provider Modifiers Qty    79785122760 HC PT THER PROC EA 15 MIN 9/13/2023 Mckayla Reed PTA GP, CQ 3                      Mckayla Reed PTA  9/13/2023

## 2023-09-19 ENCOUNTER — APPOINTMENT (OUTPATIENT)
Dept: PHYSICAL THERAPY | Facility: HOSPITAL | Age: 53
End: 2023-09-19
Payer: COMMERCIAL

## 2023-09-22 ENCOUNTER — HOSPITAL ENCOUNTER (OUTPATIENT)
Dept: PHYSICAL THERAPY | Facility: HOSPITAL | Age: 53
Setting detail: THERAPIES SERIES
Discharge: HOME | End: 2023-09-22
Payer: COMMERCIAL

## 2023-09-22 DIAGNOSIS — G89.29 CHRONIC PAIN OF RIGHT KNEE: Primary | ICD-10-CM

## 2023-09-22 DIAGNOSIS — M25.561 CHRONIC PAIN OF RIGHT KNEE: Primary | ICD-10-CM

## 2023-09-22 PROCEDURE — 97110 THERAPEUTIC EXERCISES: CPT

## 2023-09-22 NOTE — THERAPY DISCHARGE NOTE
Outpatient Physical Therapy Ortho Treatment Note/Discharge Summary  [facility]     Patient Name: Kemal Dunbar  : 1970  MRN: [id number]  Today's Date: 2023      Visit Date: 2023    Attendance: 3/3 (3 approved)  Subjective Improvement: 30%  Next MD Visit: 10/24/23  Recert Date: 10/6/23     Therapy Diagnosis: Chronic R knee pain    Visit Dx:    ICD-10-CM ICD-9-CM   1. Chronic pain of right knee  M25.561 719.46    G89.29 338.29       Patient Active Problem List   Diagnosis    Anxiety    Attention-deficit hyperactivity disorder, predominantly hyperactive type    HTN (hypertension)    Hyperlipidemia    Obstructive sleep apnea, adult    Dyspnea on exertion    Class 3 severe obesity due to excess calories with serious comorbidity and body mass index (BMI) of 40.0 to 44.9 in adult        Past Medical History:   Diagnosis Date    Heartburn     Hypertension         Past Surgical History:   Procedure Laterality Date    APPENDECTOMY      ELBOW PROCEDURE      surgery to include pins    SHOULDER SURGERY          PT Ortho       Row Name 23 1000       Subjective    Subjective Comments Patient does report compliance with HEP; Reports that he feels like his knees are about 30% better overall;  -KH       Precautions and Contraindications    Contraindications Per referral note: “ROM/STR bilateral knees. 2-3 visits for HEP. Eventual TKA. Progress as tolerated“  -KH       Posture/Observations    Posture/Observations Comments genu varus noted B knee; slightly flexed knee with gait;  -KH       Right Lower Ext    Rt Knee Extension/Flexion AROM 0-7-122°  -KH       MMT Right Lower Ext    Rt Knee Extension MMT, Gross Movement (5/5) normal  -KH    Rt Knee Flexion MMT, Gross Movement (5/5) normal  -              User Key  (r) = Recorded By, (t) = Taken By, (c) = Cosigned By      Initials Name Provider Type    Janna Lynn PTA Physical Therapist Assistant                                 PT  Assessment/Plan       Row Name 09/22/23 1000          PT Assessment    Functional Limitations Impaired gait;Impaired locomotion;Performance in leisure activities;Performance in self-care ADL;Performance in work activities  -     Impairments Balance;Gait;Impaired flexibility;Impaired muscle endurance;Impaired muscle length;Impaired muscle power;Joint integrity;Joint mobility;Muscle strength;Pain;Posture;Range of motion  -     Assessment Comments met all goals at this time; updated HEP this date and patient was given a month membership to the [facility]; all patient questions were answered and had improved strength and AROM of the R knee; continues to lack full extension and educated pt on the importance of achieving full extension prior to surgery; He verbalized understanding;  -     Rehab Potential Good  -     Patient/caregiver participated in establishment of treatment plan and goals Yes  -     Patient would benefit from skilled therapy intervention Yes  -        PT Plan    PT Frequency 1x/week  -     PT Plan Comments Discharge to [facility] and free month membership to [facility]  -               User Key  (r) = Recorded By, (t) = Taken By, (c) = Cosigned By      Initials Name Provider Type    Janna Lynn PTA Physical Therapist Assistant                     Modalities       Row Name 09/22/23 1000             Subjective Pain    Pre-Treatment Pain Level 5  -KH      Post-Treatment Pain Level 5  -                User Key  (r) = Recorded By, (t) = Taken By, (c) = Cosigned By      Initials Name Provider Type    Janna Lynn PTA Physical Therapist Assistant                     OP Exercises       Row Name 09/22/23 1000             Subjective    Subjective Comments Patient does report compliance with HEP; Reports that he feels like his knees are about 30% better overall;  -         Subjective Pain    Able to rate subjective pain? yes  -ALICE      Pre-Treatment Pain Level 5  -ALICE      Post-Treatment Pain Level 5  -KH          Exercise 1    Exercise Name 1 pro ll LE strength  -      Time 1 10 mins  -KH      Additional Comments level 4; seat 15  -KH         Exercise 2    Exercise Name 2 cybex leg press  -      Cueing 2 Verbal;Demo  -KH      Sets 2 2  -KH      Reps 2 10  -KH      Additional Comments 130#  -KH         Exercise 3    Exercise Name 3 cybex hip abduction (attmepted , unable secondary to tightness in B hip flexors  -         Exercise 4    Exercise Name 4 cybex ham curl  -      Cueing 4 Verbal;Demo  -KH      Sets 4 2  -KH      Reps 4 10  -KH      Additional Comments 50#  -KH         Exercise 5    Exercise Name 5 bridges  -KH      Cueing 5 Verbal;Demo  -KH      Sets 5 2  -KH      Reps 5 10  -KH         Exercise 6    Exercise Name 6 sit to stands  -      Cueing 6 Verbal;Demo  -      Sets 6 2  -KH      Reps 6 10  -KH         Exercise 7    Exercise Name 7 measured ROM and checked strength  -         Exercise 8    Exercise Name 8 patella mobes  -      Time 8 5 mins  -KH                User Key  (r) = Recorded By, (t) = Taken By, (c) = Cosigned By      Initials Name Provider Type    Janna Lynn PTA Physical Therapist Assistant                                    PT OP Goals       Row Name 09/22/23 1000          PT Short Term Goals    STG Date to Achieve 10/06/23  -     STG 1 Pt will be independent with final HEP for self-management of symptoms.  -     STG 1 Progress Met   -     STG 2 Pt's knee ext AROM will improve to lacking only 5 deg.  -     STG 2 Progress Met   -     STG 3 Pt’s knee flex/ext MMT will improve to 5/5 when measured at 90 deg and 45 deg flex as a measure of improved strength.  -     STG 3 Progress [facility]        Time Calculation    PT Goal Re-Cert Due Date 10/06/23  -               User Key  (r) = Recorded By, (t) = Taken By, (c) = Cosigned By      Initials Name Provider Type    Janna Lynn PTA Physical Therapist Assistant                    Therapy Education  Education  Details: bridges & sit to stands ([facility] membership)  Given: HEP, Symptoms/condition management, Other (comment) ([facility] memebership)  Program: New, Reinforced, Progressed  How Provided: Verbal, Demonstration, Written  Provided to: Patient  Level of Understanding: Teach back education performed, Verbalized, Demonstrated              Time Calculation:   Start Time: 1100  Stop Time: 1138  Time Calculation (min): 38 min  Therapy Charges for Today       Code Description Service Date Service Provider Modifiers Qty    97619275222 HC PT THER PROC EA 15 MIN 9/22/2023 Janna Antonio PTA GP, CQ 3                  OP PT Discharge Summary  Date of Discharge: 09/22/23  Reason for Discharge: All goals achieved, Independent, Per MD order  Outcomes Achieved: Able to achieve all goals within established timeline  Discharge Destination: Home with home program      Janna Antonio PTA  9/22/2023

## 2023-09-26 ENCOUNTER — OFFICE VISIT (OUTPATIENT)
Dept: SLEEP MEDICINE | Facility: HOSPITAL | Age: 53
End: 2023-09-26
Payer: COMMERCIAL

## 2023-09-26 VITALS
SYSTOLIC BLOOD PRESSURE: 159 MMHG | DIASTOLIC BLOOD PRESSURE: 87 MMHG | HEIGHT: 73 IN | HEART RATE: 86 BPM | BODY MASS INDEX: 41.75 KG/M2 | OXYGEN SATURATION: 94 % | WEIGHT: 315 LBS

## 2023-09-26 DIAGNOSIS — E66.01 MORBID OBESITY: ICD-10-CM

## 2023-09-26 DIAGNOSIS — G47.33 OBSTRUCTIVE SLEEP APNEA: Primary | ICD-10-CM

## 2023-09-26 PROCEDURE — 99213 OFFICE O/P EST LOW 20 MIN: CPT | Performed by: NURSE PRACTITIONER

## 2023-09-26 RX ORDER — ATORVASTATIN CALCIUM 40 MG/1
40 TABLET, FILM COATED ORAL
COMMUNITY
Start: 2022-10-11 | End: 2023-10-12

## 2023-09-26 NOTE — PROGRESS NOTES
Sleep Clinic Follow Up    Date: 9/26/2023  Primary Care Provider: Lidya Sauer APRN    Last office visit: 09/09/2022 (I reviewed this note)    CC: Follow up: IONA on CPAP      Interim History:  Since the last visit:    1) moderate IONA -  eKmal Dunbar has remained compliant with CPAP. He denies mask and machine issues, dry mouth, headaches, or pressures intolerance. He denies abnormal dreams, sleep paralysis, nasal congestion, URI sx.    2) Patient denies RLS symptoms.     Sleep Testing:    Split night PSG on 04/04/2018, AHI of 27.8   CPAP titration on same day, recommended 8-20 cm H2O   Currently on 10-20 cm H2O    PAP Data:    Time frame: 09/21/2022-09/18/2023   Compliance: 99.7%  Average use on days used: 8 hrs 51 min  Percent of days with usage greater than or equal to 4 hours: 97.5%  PAP range: 14-20 cm H2O  Average 90% pressure: 16.1 cmH2O  Leak: 1 minutes  Average AHI: 1.5 events/hr  Mask type: Nasal pillows  DME: switching to [employer]  Machine type: [employer] Dream[employer] 2    Bed time: 0400  Sleep latency: 60 minutes  Number of times awakens during the night: 3  Wake time: 1200  Estimated total sleep time at night: 6 hours  Caffeine intake: 0 cups of coffee, 1 cups of tea, and 1 sodas per day  Alcohol intake: 0 drinks per week  Nap time: none   Sleepiness with Driving: none     [place] - 6  [place] Sleepiness Scale  Sitting and reading: Would never doze  Watching TV: Would never doze  Sitting, inactive in a public place (e.g. a theatre or a meeting): Would never doze  As a passenger in a car for an hour without a break: Slight chance of dozing  Lying down to rest in the afternoon when circumstances permit: High chance of dozing  Sitting and talking to someone: Would never doze  Sitting quietly after a lunch without alcohol: Slight chance of dozing  In a car, while stopped for a few minutes in traffic: Slight chance of dozing  Total score: 6    PMHx, FH, SH reviewed and pertinent changes  are: Reportedly unchanged from last office visit with us. Upcoming knee replacement.       Review of Systems:   Negative for chest pain, SOA, fever, chills, cough, N/V/D, abdominal pain.    Smoking:none    Kemal Dunbar  reports that he has never smoked. He has never used smokeless tobacco.    Physical Exam:  Vitals:    09/26/23 1307   BP: 159/87   Pulse: 86   SpO2: 94%           09/26/23  1307   Weight: (!) 146 kg (322 lb)     Body mass index is 42.49 kg/m².   Class 3 Severe Obesity (BMI >=40). Obesity-related health conditions include the following: obstructive sleep apnea, hypertension, and dyslipidemias. Obesity is improving with lifestyle modifications. BMI is is above average; BMI management plan is completed. I recommend portion control and increasing exercise.      Gen:                No distress, conversant, pleasant, appears stated age, alert, oriented  Eyes:               Anicteric sclera, moist conjunctiva, no lid lag                           PERRL, EOMI   Heent:             NC/AT                          Oropharynx clear                          Normal hearing  Lungs:             Normal effort, non-labored breathing        CV:                  Normal S1/S2                          No lower extremity edema  ABD:               Soft, rounded, non-distended                    Psych:             Appropriate affect  Neuro:             CN 2-12 appear intact    Past Medical History:   Diagnosis Date    Heartburn     Hypertension        Current Outpatient Medications:     albuterol sulfate  (90 Base) MCG/ACT inhaler, Inhale 2 puffs Every 4 (Four) Hours As Needed for Wheezing or Shortness of Air., Disp: 6.7 g, Rfl: 0    atorvastatin (LIPITOR) 40 MG tablet, Take 1 tablet by mouth., Disp: , Rfl:     celecoxib (CeleBREX) 200 MG capsule, Take 1 capsule by mouth Daily., Disp: 30 capsule, Rfl: 0    escitalopram (LEXAPRO) 10 MG tablet, Take 1 tablet by mouth Daily., Disp: , Rfl:      lisinopril-hydrochlorothiazide (PRINZIDE,ZESTORETIC) 20-12.5 MG per tablet, Take 1 tablet by mouth Daily., Disp: , Rfl:     omeprazole (priLOSEC) 40 MG capsule, TK ONE C PO  QD PRN, Disp: , Rfl: 0    levoFLOXacin (LEVAQUIN) 750 MG tablet, Take 1 tablet by mouth Daily. (Patient not taking: Reported on 9/26/2023), Disp: 6 tablet, Rfl: 0    WBC   Date Value Ref Range Status   01/30/2022 9.93 3.40 - 10.80 10*3/mm3 Final     RBC   Date Value Ref Range Status   01/30/2022 5.42 4.14 - 5.80 10*6/mm3 Final     Hemoglobin   Date Value Ref Range Status   01/30/2022 15.1 13.0 - 17.7 g/dL Final     Hematocrit   Date Value Ref Range Status   01/30/2022 43.8 37.5 - 51.0 % Final     MCV   Date Value Ref Range Status   01/30/2022 80.8 79.0 - 97.0 fL Final     MCH   Date Value Ref Range Status   01/30/2022 27.9 26.6 - 33.0 pg Final     MCHC   Date Value Ref Range Status   01/30/2022 34.5 31.5 - 35.7 g/dL Final     RDW   Date Value Ref Range Status   01/30/2022 13.9 12.3 - 15.4 % Final     RDW-SD   Date Value Ref Range Status   01/30/2022 40.8 37.0 - 54.0 fl Final     MPV   Date Value Ref Range Status   01/30/2022 11.9 6.0 - 12.0 fL Final     Platelets   Date Value Ref Range Status   01/30/2022 295 140 - 450 10*3/mm3 Final     Neutrophil %   Date Value Ref Range Status   01/30/2022 81.5 (H) 42.7 - 76.0 % Final     Lymphocyte %   Date Value Ref Range Status   01/30/2022 8.2 (L) 19.6 - 45.3 % Final     Monocyte %   Date Value Ref Range Status   01/30/2022 7.3 5.0 - 12.0 % Final     Eosinophil %   Date Value Ref Range Status   01/30/2022 1.7 0.3 - 6.2 % Final     Basophil %   Date Value Ref Range Status   01/30/2022 0.6 0.0 - 1.5 % Final     Immature Grans %   Date Value Ref Range Status   01/30/2022 0.7 (H) 0.0 - 0.5 % Final     Neutrophils, Absolute   Date Value Ref Range Status   01/30/2022 8.10 (H) 1.70 - 7.00 10*3/mm3 Final     Lymphocytes, Absolute   Date Value Ref Range Status   01/30/2022 0.81 0.70 - 3.10 10*3/mm3 Final      Monocytes, Absolute   Date Value Ref Range Status   01/30/2022 0.72 0.10 - 0.90 10*3/mm3 Final     Eosinophils, Absolute   Date Value Ref Range Status   01/30/2022 0.17 0.00 - 0.40 10*3/mm3 Final     Basophils, Absolute   Date Value Ref Range Status   01/30/2022 0.06 0.00 - 0.20 10*3/mm3 Final     Immature Grans, Absolute   Date Value Ref Range Status   01/30/2022 0.07 (H) 0.00 - 0.05 10*3/mm3 Final     nRBC   Date Value Ref Range Status   01/30/2022 0.0 0.0 - 0.2 /100 WBC Final       Lab Results   Component Value Date    GLUCOSE 104 (H) 01/30/2022    BUN 9 01/30/2022    CREATININE 0.68 (L) 01/30/2022    BCR 13.2 01/30/2022    K 4.1 01/30/2022    CO2 22.0 01/30/2022    CALCIUM 8.9 01/30/2022    ALBUMIN 4.10 01/30/2022    BILITOT 0.4 01/30/2022    AST 24 01/30/2022    ALT 26 01/30/2022       Assessment and Plan:    Obstructive sleep apnea - Established, stable (1)  Compliant with PAP therapy  Continue PAP as prescribed  Script for PAP supplies  Pertinent labs were reviewed as listed above  Return to clinic in 1 year with compliance report unless change in symptoms in interim period  2. Morbid obesity - BMI 42.5 - stable chronic illness      I spent 15 minutes caring for Kemal on this date of service. This time includes time spent by me in the following activities: preparing for the visit, reviewing tests, obtaining and/or reviewing a separately obtained history, performing a medically appropriate examination and/or evaluation , counseling and educating the patient/family/caregiver, documenting information in the medical record, and care coordination; discussing PAP therapy, PAP compliance, and PAP maintenance    RTC in 12 months. Patient agrees to return sooner if changes in symptoms.          This document has been electronically signed by ASHLEE Wolf on September 26, 2023 13:14 CDT            CC: Lidya Sauer APRN          No ref. provider found

## 2023-09-28 ENCOUNTER — APPOINTMENT (OUTPATIENT)
Dept: PHYSICAL THERAPY | Facility: HOSPITAL | Age: 53
End: 2023-09-28
Payer: COMMERCIAL